# Patient Record
Sex: MALE | Race: WHITE | Employment: OTHER | URBAN - METROPOLITAN AREA
[De-identification: names, ages, dates, MRNs, and addresses within clinical notes are randomized per-mention and may not be internally consistent; named-entity substitution may affect disease eponyms.]

---

## 2017-01-13 DIAGNOSIS — F41.9 ANXIETY: ICD-10-CM

## 2017-01-17 RX ORDER — CLONIDINE HYDROCHLORIDE 0.1 MG/1
TABLET ORAL
Qty: 60 TAB | Refills: 1 | OUTPATIENT
Start: 2017-01-17

## 2017-01-17 NOTE — TELEPHONE ENCOUNTER
He said that the clonidine did not help in past so I will ask that he come in to discuss before getting more.

## 2017-01-19 DIAGNOSIS — M54.2 NECK PAIN: ICD-10-CM

## 2017-01-19 RX ORDER — CYCLOBENZAPRINE HCL 10 MG
TABLET ORAL
Qty: 30 TAB | Refills: 1 | Status: SHIPPED | OUTPATIENT
Start: 2017-01-19 | End: 2017-03-02 | Stop reason: SDUPTHER

## 2017-01-24 DIAGNOSIS — F41.9 ANXIETY: ICD-10-CM

## 2017-01-24 RX ORDER — CLONIDINE HYDROCHLORIDE 0.1 MG/1
TABLET ORAL
Qty: 60 TAB | Refills: 1 | OUTPATIENT
Start: 2017-01-24

## 2017-01-31 ENCOUNTER — OFFICE VISIT (OUTPATIENT)
Dept: FAMILY MEDICINE CLINIC | Age: 42
End: 2017-01-31

## 2017-01-31 VITALS
DIASTOLIC BLOOD PRESSURE: 76 MMHG | BODY MASS INDEX: 37.55 KG/M2 | WEIGHT: 258 LBS | HEART RATE: 96 BPM | TEMPERATURE: 98.2 F | SYSTOLIC BLOOD PRESSURE: 103 MMHG

## 2017-01-31 DIAGNOSIS — J45.40 MODERATE PERSISTENT CHRONIC ASTHMA WITHOUT COMPLICATION: Primary | ICD-10-CM

## 2017-01-31 DIAGNOSIS — Z23 ENCOUNTER FOR IMMUNIZATION: ICD-10-CM

## 2017-01-31 DIAGNOSIS — I10 ESSENTIAL HYPERTENSION: ICD-10-CM

## 2017-01-31 NOTE — PROGRESS NOTES
[facility] documentation. No contraindications for administering today's ordered vaccines. Vaccine Immunization Statement(s) given and instructions for adverse reaction. No adverse reaction noted at time of discharge, explained possible s/e. Reviewed symptoms indicating need to be seen in ER. Patient given ppv23 vaccine; denies fever. Pt had no adverse reaction at time of d/c. Vaccine administration documented into [place] Immunization Information System.

## 2017-01-31 NOTE — MR AVS SNAPSHOT
Visit Information Date & Time Provider Department Dept. Phone Encounter #  
 1/31/2017  3:15 PM Bean Pringle, [address] [phone] [id number] Follow-up Instructions Return in about 6 weeks (around 3/14/2017). Upcoming Health Maintenance Date Due Pneumococcal 19-64 Medium Risk (1 of 1 - PPSV23) 9/18/1993 DTaP/Tdap/Td series (1 - Tdap) 9/18/1995 Allergies as of 1/31/2017  Review Complete On: 1/31/2017 By: Bean Pringle MD  
 No Known Allergies Current Immunizations  Reviewed on 1/31/2017 Name Date Influenza Vaccine 9/20/2016 Influenza Vaccine (Quad) PF 10/6/2015  1:10 PM  
 Pneumococcal Polysaccharide (PPSV-23)  Incomplete Reviewed by Bean Pringle MD on 1/31/2017 at  4:17 PM  
You Were Diagnosed With   
  
 Codes Comments Moderate persistent chronic asthma without complication    -  Primary ICD-10-CM: J45.40 ICD-9-CM: 493.90 Essential hypertension     ICD-10-CM: I10 
ICD-9-CM: 401.9 Encounter for immunization     ICD-10-CM: B43 ICD-9-CM: V03.89 Vitals BP Pulse Temp Weight(growth percentile) BMI Smoking Status 103/76 (BP 1 Location: Left arm, BP Patient Position: Sitting) 96 98.2 °F (36.8 °C) (Oral) 258 lb (117 kg) 37.55 kg/m2 Former Smoker Vitals History BMI and BSA Data Body Mass Index Body Surface Area  
 37.55 kg/m 2 2.4 m 2 Preferred Pharmacy Pharmacy Name Phone [address] [phone] Your Updated Medication List  
  
   
This list is accurate as of: 1/31/17  4:21 PM.  Always use your most recent med list.  
  
  
  
  
 * albuterol 2.5 mg /3 mL (0.083 %) nebulizer solution Commonly known as:  PROVENTIL VENTOLIN  
3 mL by Nebulization route every four (4) hours as needed for Wheezing. * VENTOLIN HFA 90 mcg/actuation inhaler Generic drug:  albuterol INHALE 2 PUFFS BY MOUTH EVERY 4 TO 6 HOURS IF NEEDED FOR BREATHING.  
  
 budesonide-formoterol 160-4.5 mcg/actuation HFA inhaler Commonly known as:  SYMBICORT Take 2 Puffs by inhalation two (2) times a day. cyclobenzaprine 10 mg tablet Commonly known as:  FLEXERIL  
take 1 tablet by mouth three times a day if needed for muscle spasm  
  
 guaiFENesin  mg SR tablet Commonly known as:  [address] Take 1 Tab by mouth two (2) times a day. lisinopril-hydroCHLOROthiazide 10-12.5 mg per tablet Commonly known as:  Izola Boots Take 1 Tab by mouth daily. montelukast 10 mg tablet Commonly known as:  SINGULAIR  
take 1 tablet by mouth once daily * Notice: This list has 2 medication(s) that are the same as other medications prescribed for you. Read the directions carefully, and ask your doctor or other care provider to review them with you. We Performed the Following PNEUMOCOCCAL POLYSACCHARIDE VACCINE, 23-VALENT, ADULT OR IMMUNOSUPPRESSED PT DOSE, [42191 CPT(R)] Follow-up Instructions Return in about 6 weeks (around 3/14/2017). Introducing [facility] HE[facility]! [facility] introduces [employer] patient portal. Now you can access parts of your medical record, email your doctor's office, and request medication refills online. 1. In your internet browser, go to [email or web address] 2. Click on the First Time User? Click Here link in the Sign In box. You will see the New Member Sign Up page. 3. Enter your [employer] Access Code exactly as it appears below. You will not need to use this code after youve completed the sign-up process. If you do not sign up before the expiration date, you must request a new code. · [employer] Access Code: [email or web address] Expires: 5/1/2017  4:16 PM 
 
4. Enter the last four digits of your Social Security Number (xxxx) and Date of Birth (mm/dd/yyyy) as indicated and click Submit.  You will be taken to the next sign-up page. 5. Create a Work[employer] ID. This will be your Work[employer] login ID and cannot be changed, so think of one that is secure and easy to remember. 6. Create a WorkSn[employer] password. You can change your password at any time. 7. Enter your Password Reset Question and Answer. This can be used at a later time if you forget your password. 8. Enter your e-mail address. You will receive e-mail notification when new information is available in [address] 9. Click Sign Up. You can now view and download portions of your medical record. 10. Click the Download Summary menu link to download a portable copy of your medical information. If you have questions, please visit the Frequently Asked Questions section of the WorkSn[employer] website. Remember, Work[employer] is NOT to be used for urgent needs. For medical emergencies, dial 911. Now available from your iPhone and Android! Please provide this summary of care documentation to your next provider. Your primary care clinician is listed as Phys Other. If you have any questions after today's visit, please call [phone].

## 2017-01-31 NOTE — PROGRESS NOTES
Coordination of Care  1. Have you been to the ER, urgent care clinic since your last visit? Hospitalized since your last visit? No    2. Have you seen or consulted any other health care providers outside of the [address] [place] [address] since your last visit? Include any pap smears or colon screening. No    Medications  Medication Reconciliation Performed: no  Patient does not need refills     Learning Assessment Complete?  yes

## 2017-03-02 DIAGNOSIS — M54.2 NECK PAIN: ICD-10-CM

## 2017-03-02 RX ORDER — CYCLOBENZAPRINE HCL 10 MG
10 TABLET ORAL
Qty: 30 TAB | Refills: 2 | Status: SHIPPED | OUTPATIENT
Start: 2017-03-02 | End: 2017-04-27 | Stop reason: SDUPTHER

## 2017-03-13 ENCOUNTER — TELEPHONE (OUTPATIENT)
Dept: FAMILY MEDICINE CLINIC | Age: 42
End: 2017-03-13

## 2017-03-13 NOTE — TELEPHONE ENCOUNTER
Pt LM asking for a call, he has a question about his prescription. Called LM asking him to call the clinic.

## 2017-04-19 ENCOUNTER — TELEPHONE (OUTPATIENT)
Dept: FAMILY MEDICINE CLINIC | Age: 42
End: 2017-04-19

## 2017-04-19 NOTE — TELEPHONE ENCOUNTER
Pt left message stating that he thought that he might have an appt today. RN called pt back, left message for return call. Pt had appt on 3/15/17 that was cancelled. No upcoming appts listed.   Do Riggins RN

## 2017-04-26 ENCOUNTER — OFFICE VISIT (OUTPATIENT)
Dept: FAMILY MEDICINE CLINIC | Age: 42
End: 2017-04-26

## 2017-04-26 VITALS
SYSTOLIC BLOOD PRESSURE: 124 MMHG | BODY MASS INDEX: 38.06 KG/M2 | OXYGEN SATURATION: 92 % | HEART RATE: 92 BPM | DIASTOLIC BLOOD PRESSURE: 67 MMHG | HEIGHT: 69 IN | TEMPERATURE: 97.9 F | WEIGHT: 257 LBS

## 2017-04-26 DIAGNOSIS — S61.452A DOG BITE, HAND, LEFT, INITIAL ENCOUNTER: ICD-10-CM

## 2017-04-26 DIAGNOSIS — I10 ESSENTIAL HYPERTENSION WITH GOAL BLOOD PRESSURE LESS THAN 140/90: ICD-10-CM

## 2017-04-26 DIAGNOSIS — M70.22 OLECRANON BURSITIS OF LEFT ELBOW: ICD-10-CM

## 2017-04-26 DIAGNOSIS — J45.40 MODERATE PERSISTENT CHRONIC ASTHMA WITHOUT COMPLICATION: Primary | ICD-10-CM

## 2017-04-26 DIAGNOSIS — J30.2 SEASONAL ALLERGIC RHINITIS, UNSPECIFIED ALLERGIC RHINITIS TRIGGER: ICD-10-CM
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
## 2017-04-26 RX ORDER — ALBUTEROL SULFATE 0.83 MG/ML
2.5 SOLUTION RESPIRATORY (INHALATION)
Qty: 1 PACKAGE | Refills: 2 | Status: SHIPPED | OUTPATIENT
Start: 2017-04-26 | End: 2018-02-07 | Stop reason: SDUPTHER

## 2017-04-26 RX ORDER — ALBUTEROL SULFATE 90 UG/1
2 AEROSOL, METERED RESPIRATORY (INHALATION)
Qty: 2 INHALER | Refills: 6 | Status: SHIPPED | OUTPATIENT
Start: 2017-04-26 | End: 2017-10-19 | Stop reason: SDUPTHER

## 2017-04-26 RX ORDER — AMOXICILLIN AND CLAVULANATE POTASSIUM 500; 125 MG/1; MG/1
1 TABLET, FILM COATED ORAL EVERY 12 HOURS
Qty: 14 TAB | Refills: 0 | Status: SHIPPED | OUTPATIENT
Start: 2017-04-26 | End: 2017-05-03

## 2017-04-26 RX ORDER — LISINOPRIL AND HYDROCHLOROTHIAZIDE 10; 12.5 MG/1; MG/1
1 TABLET ORAL DAILY
Qty: 30 TAB | Refills: 5 | Status: SHIPPED | OUTPATIENT
Start: 2017-04-26 | End: 2017-10-19 | Stop reason: SDUPTHER

## 2017-04-26 RX ORDER — MOMETASONE FUROATE 50 UG/1
2 SPRAY, METERED NASAL DAILY
Qty: 2 CONTAINER | Refills: 5 | Status: SHIPPED | OUTPATIENT
Start: 2017-04-26 | End: 2018-08-30 | Stop reason: SDUPTHER

## 2017-04-26 NOTE — MR AVS SNAPSHOT
Visit Information Date & Time Provider Department Dept. Phone Encounter #  
 4/26/2017  2:55 PM Keisha Encinas, [address] [place] [address] Avenue [phone] [id number] Upcoming Health Maintenance Date Due DTaP/Tdap/Td series (1 - Tdap) 9/18/1995 Allergies as of 4/26/2017  Review Complete On: 4/26/2017 By: Keisha Encinas MD  
 No Known Allergies Current Immunizations  Reviewed on 1/31/2017 Name Date Influenza Vaccine 9/20/2016 Influenza Vaccine (Quad) PF 10/6/2015  1:10 PM  
 Pneumococcal Polysaccharide (PPSV-23) 1/31/2017 Not reviewed this visit You Were Diagnosed With   
  
 Codes Comments Moderate persistent chronic asthma without complication    -  Primary ICD-10-CM: J45.40 ICD-9-CM: 493.90 Simple chronic bronchitis (HCC)     ICD-10-CM: J41.0 ICD-9-CM: 491.0 Seasonal allergic rhinitis, unspecified allergic rhinitis trigger     ICD-10-CM: J30.2 ICD-9-CM: 477.9 Moderate persistent chronic asthma with acute exacerbation     ICD-10-CM: J45.41 
ICD-9-CM: 493.92 Essential hypertension with goal blood pressure less than 140/90     ICD-10-CM: I10 
ICD-9-CM: 401.9 Olecranon bursitis of left elbow     ICD-10-CM: M70.22 ICD-9-CM: 726.33 Vitals BP Pulse Temp Height(growth percentile) Weight(growth percentile) SpO2  
 124/67 (BP 1 Location: Right arm, BP Patient Position: Sitting) 92 97.9 °F (36.6 °C) (Oral) 5' 9.49\" (1.765 m) 257 lb (116.6 kg) 92% BMI Smoking Status 37.42 kg/m2 Former Smoker Vitals History BMI and BSA Data Body Mass Index Body Surface Area  
 37.42 kg/m 2 2.39 m 2 Preferred Pharmacy Pharmacy Name Phone [address] [place] [address], [address] [facility] [phone] Your Updated Medication List  
  
   
This list is accurate as of: 4/26/17  4:00 PM.  Always use your most recent med list.  
  
  
  
  
 * albuterol 90 mcg/actuation inhaler Commonly known as:  VENTOLIN HFA Take 2 Puffs by inhalation every four (4) hours as needed for Wheezing. * albuterol 2.5 mg /3 mL (0.083 %) nebulizer solution Commonly known as:  PROVENTIL VENTOLIN  
3 mL by Nebulization route every four (4) hours as needed for Wheezing. amoxicillin-clavulanate 500-125 mg per tablet Commonly known as:  AUGMENTIN Take 1 Tab by mouth every twelve (12) hours for 7 days. budesonide-formoterol 160-4.5 mcg/actuation HFA inhaler Commonly known as:  SYMBICORT Take 2 Puffs by inhalation two (2) times a day. cyclobenzaprine 10 mg tablet Commonly known as:  FLEXERIL Take 1 Tab by mouth three (3) times daily as needed for Muscle Spasm(s). guaiFENesin  mg SR tablet Commonly known as:  [address] Take 1 Tab by mouth two (2) times a day. lisinopril-hydroCHLOROthiazide 10-12.5 mg per tablet Commonly known as:  Pritesh Junito Take 1 Tab by mouth daily. mometasone 50 mcg/actuation nasal spray Commonly known as:  NASONEX  
2 Sprays by Both Nostrils route daily. montelukast 10 mg tablet Commonly known as:  SINGULAIR  
take 1 tablet by mouth once daily * Notice: This list has 2 medication(s) that are the same as other medications prescribed for you. Read the directions carefully, and ask your doctor or other care provider to review them with you. Prescriptions Sent to Pharmacy Refills  
 mometasone (NASONEX) 50 mcg/actuation nasal spray 5 Si Sprays by Both Nostrils route daily. Class: Normal  
 Pharmacy: [address] [place] [address] Ph #: [phone] Route: Both Nostrils  
 albuterol (VENTOLIN HFA) 90 mcg/actuation inhaler 6 Sig: Take 2 Puffs by inhalation every four (4) hours as needed for Wheezing. Class: Normal  
 Pharmacy: [address] [place] [address] Ph #: [phone] Route: Inhalation albuterol (PROVENTIL VENTOLIN) 2.5 mg /3 mL (0.083 %) nebulizer solution 2 Sig: 3 mL by Nebulization route every four (4) hours as needed for Wheezing. Class: Normal  
 Pharmacy: [address] , [address] [facility] Ph #: [phone] Route: Nebulization  
 amoxicillin-clavulanate (AUGMENTIN) 500-125 mg per tablet 0 Sig: Take 1 Tab by mouth every twelve (12) hours for 7 days. Class: Normal  
 Pharmacy: [address] , [address] [place] [address] Ph #: [phone] Route: Oral  
 lisinopril-hydroCHLOROthiazide (PRINZIDE, ZESTORETIC) 10-12.5 mg per tablet 5 Sig: Take 1 Tab by mouth daily. Class: Normal  
 Pharmacy: [address] , [address] [place] [address] Ph #: [phone] Route: Oral  
  
Patient Instructions Start generic Claritin, Allegra, or Zyrtec. Introducing [facility]! Denise Alonso introduces [employer] patient portal. Now you can access parts of your medical record, email your doctor's office, and request medication refills online. 1. In your internet browser, go to [email or web address]. [email or web address] 2. Click on the First Time User? Click Here link in the Sign In box. You will see the New Member Sign Up page. 3. Enter your [employer] Access Code exactly as it appears below. You will not need to use this code after youve completed the sign-up process. If you do not sign up before the expiration date, you must request a new code. · [employer] Access Code: [email or web address] Expires: 5/1/2017  5:16 PM 
 
4. Enter the last four digits of your Social Security Number (xxxx) and Date of Birth (mm/dd/yyyy) as indicated and click Submit. You will be taken to the next sign-up page. 5. Create a [employer] ID. This will be your [employer] login ID and cannot be changed, so think of one that is secure and easy to remember. 6. Create a [employer] System[employer]arMeetapp password. You can change your password at any time. 7. Enter your Password Reset Question and Answer. This can be used at a later time if you forget your password. 8. Enter your e-mail address. You will receive e-mail notification when new information is available in [address] 9. Click Sign Up. You can now view and download portions of your medical record. 10. Click the Download Summary menu link to download a portable copy of your medical information. If you have questions, please visit the Frequently Asked Questions section of the [employer] website. Remember, [employer] is NOT to be used for urgent needs. For medical emergencies, dial 911. Now available from your iPhone and Android! Please provide this summary of care documentation to your next provider. Your primary care clinician is listed as Phys Other. If you have any questions after today's visit, please call [phone].

## 2017-04-26 NOTE — PROGRESS NOTES
Coordination of Care  1. Have you been to the ER, urgent care clinic since your last visit? Hospitalized since your last visit? No    2. Have you seen or consulted any other health care providers outside of the [facility] since your last visit? Include any pap smears or colon screening. No    Medications  Medication Reconciliation Performed: no  Patient does need refills     Learning Assessment Complete?  yes
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
## 2017-04-27 DIAGNOSIS — M54.2 NECK PAIN: ICD-10-CM

## 2017-04-27 RX ORDER — CYCLOBENZAPRINE HCL 10 MG
10 TABLET ORAL
Qty: 30 TAB | Refills: 1 | Status: SHIPPED | OUTPATIENT
Start: 2017-04-27 | End: 2017-06-26 | Stop reason: SDUPTHER

## 2017-04-28 ENCOUNTER — TELEPHONE (OUTPATIENT)
Dept: FAMILY MEDICINE CLINIC | Age: 42
End: 2017-04-28

## 2017-04-28 NOTE — TELEPHONE ENCOUNTER
.Pt left message asking about a refill for Flexeril that he discussed with Dr. Zaid Walden during his visit on 4/26/17. Chart review indicates Flexeril was refilled on 4/27/17, 30 tabs plus one refill. RN called pt, left message regarding this refill.  Los Christensen RN

## 2017-05-22 ENCOUNTER — OFFICE VISIT (OUTPATIENT)
Dept: FAMILY MEDICINE CLINIC | Age: 42
End: 2017-05-22

## 2017-05-22 VITALS
WEIGHT: 244 LBS | OXYGEN SATURATION: 94 % | TEMPERATURE: 97.9 F | DIASTOLIC BLOOD PRESSURE: 80 MMHG | HEART RATE: 123 BPM | BODY MASS INDEX: 35.53 KG/M2 | SYSTOLIC BLOOD PRESSURE: 130 MMHG

## 2017-05-22 DIAGNOSIS — J45.40 ASTHMA, CHRONIC, MODERATE PERSISTENT, UNCOMPLICATED: ICD-10-CM

## 2017-05-22 DIAGNOSIS — J45.901 ASTHMA EXACERBATION: ICD-10-CM

## 2017-05-22 RX ORDER — ALBUTEROL SULFATE 0.83 MG/ML
2.5 SOLUTION RESPIRATORY (INHALATION) ONCE
Qty: 1 EACH | Refills: 0
Start: 2017-05-22 | End: 2017-05-22

## 2017-05-22 RX ORDER — PREDNISONE 20 MG/1
TABLET ORAL
Qty: 20 TAB | Refills: 0 | Status: SHIPPED | OUTPATIENT
Start: 2017-05-22 | End: 2017-07-06 | Stop reason: ALTCHOICE

## 2017-05-22 RX ORDER — MONTELUKAST SODIUM 10 MG/1
TABLET ORAL
Qty: 30 TAB | Refills: 0 | Status: SHIPPED | OUTPATIENT
Start: 2017-05-22 | End: 2017-10-06 | Stop reason: SDUPTHER

## 2017-05-22 RX ORDER — FLUTICASONE PROPIONATE AND SALMETEROL 250; 50 UG/1; UG/1
1 POWDER RESPIRATORY (INHALATION) EVERY 12 HOURS
Qty: 1 INHALER | Refills: 5 | Status: SHIPPED | OUTPATIENT
Start: 2017-05-22 | End: 2017-06-01

## 2017-05-22 NOTE — PROGRESS NOTES
HISTORY OF PRESENT ILLNESS  Oli Nails is a 42 y.o. male. HPI Comments: Wheezing with cough prod clear sputum for 2 weeks, nasal congestion and clear nasal d/c, no fever or h/a. .  Taking all meds correctly except hasn't had the $ for singulair. Sounds like he is using neb less than once daily but some days several times. Also sounds like he has no money right now. Wife is here in tears that they are losing their house after she recently lost her job. He is a nonsmoker, has always had seasonal allergies, h/o adenoid removal as a child. Was told at [facility] that they no longer have symbicort. Shortness of Breath         Review of Systems   Respiratory: Positive for shortness of breath. Physical Exam   Constitutional: He appears well-developed and well-nourished. He appears distressed. Initially sob with rapid breathing and pulse 120. After slow deep breathing for 5-10 minutes, pulse and rr decreased. Wt down 13 lb from 2 months ago. Pulmonary/Chest:   Diffuse exp wheezing with fair air movement initially, improved after each of 3 tx. After the third, he still had a few wheezes right lower lungfields, no crackles. ASSESSMENT and PLAN  Asthma exaccerbation. pred taper over 2 weeks, use albuterol neb or inhaler tid-qid prn cough or wheezing. Will rx advair instead of symbicort. Use of last resort fund for 1 month of singulair and the pred. F/u 2 weeks, prn earlier. Discussed that if sx get like today again, needs to go to ED.

## 2017-05-22 NOTE — PROGRESS NOTES
Administered three albuterol sulfate inh sol nebulizer treatments to patient back to back per verbal order from provider Dr. Kingsley Plummer, patient tolerated well, Dr Kingsley Plummer notified after completion of each treatment. Each treatment consists of albuterol sulfate inh sol 0.083% 2.5mg/3ml , lot #[id number], exp Nov 2018, manufactured by [facility] [address] [place] [address] initiated per Dr. Kingsley Plummer for this patient to receive one month of the Singulair and the Rx for the prednisone. Last Resort paperwork filled out per instructions, nursing supervisor Gagandeep Burch RN notified, faxed to pt pharmacy [facility] on [address], copy of what was sent to pharmacy given to pt, instructed pt to call the clinic back with any problems w/getting the meds. Paperwork also sent to [facility].

## 2017-05-25 ENCOUNTER — OFFICE VISIT (OUTPATIENT)
Dept: FAMILY MEDICINE CLINIC | Age: 42
End: 2017-05-25

## 2017-05-25 VITALS
SYSTOLIC BLOOD PRESSURE: 122 MMHG | BODY MASS INDEX: 36.11 KG/M2 | DIASTOLIC BLOOD PRESSURE: 77 MMHG | WEIGHT: 248 LBS | TEMPERATURE: 98 F | HEART RATE: 100 BPM | OXYGEN SATURATION: 95 %

## 2017-05-25 DIAGNOSIS — J45.901 EXACERBATION OF ASTHMA: ICD-10-CM

## 2017-05-25 DIAGNOSIS — M70.22 OLECRANON BURSITIS OF LEFT ELBOW: Primary | ICD-10-CM

## 2017-05-25 NOTE — PROGRESS NOTES
HISTORY OF PRESENT ILLNESS  Mian Nunn is a 42 y.o. male. HPI  He returns for follow up of his left elbow effusion. His left elbow effusion is almost gone away completely and it is not bothering him now. Breathing is improving. Still on prednisone. He is pleased that his breathing is nearing baseline. He has an appointment to come back on June 6 for follow-up of his breathing problem. ROS    Visit Vitals    /77 (BP 1 Location: Left arm, BP Patient Position: Sitting)    Pulse 100    Temp 98 °F (36.7 °C) (Oral)    Wt 248 lb (112.5 kg)    SpO2 95%    BMI 36.11 kg/m2     Physical Exam   Constitutional: He appears well-developed and well-nourished. No distress. Cardiovascular: Regular rhythm and normal heart sounds. Pulmonary/Chest: Effort normal. He has wheezes (diffusely). He has no rhonchi. He has no rales. Musculoskeletal:   There is a very small effusion remaining in the left elbow olecranon bursa. The area is not red, hot, or tender. There is a small area of induration felt that is mobile. Vitals reviewed. ASSESSMENT and PLAN    ICD-10-CM ICD-9-CM    1. Olecranon bursitis of left elbow M70.22 726.33    2. Exacerbation of asthma J45.901 493.92        Recommend he try to avoid any trauma to the left elbow. Continue to finish taking the prednisone as ordered. Return for the June 6 follow-up appointment.

## 2017-05-25 NOTE — PROGRESS NOTES
Coordination of Care  1. Have you been to the ER, urgent care clinic since your last visit? Hospitalized since your last visit? No    2. Have you seen or consulted any other health care providers outside of the [address] [place] [address] since your last visit? Include any pap smears or colon screening. No    Medications  Medication Reconciliation Performed: yes  Patient does not need refills     Learning Assessment Complete?  yes

## 2017-06-01 ENCOUNTER — HOSPITAL ENCOUNTER (EMERGENCY)
Age: 42
Discharge: HOME | End: 2017-06-01
Attending: EMERGENCY MEDICINE
Payer: SELF-PAY

## 2017-06-01 ENCOUNTER — TELEPHONE (OUTPATIENT)
Dept: FAMILY MEDICINE CLINIC | Age: 42
End: 2017-06-01

## 2017-06-01 VITALS
OXYGEN SATURATION: 99 % | RESPIRATION RATE: 20 BRPM | SYSTOLIC BLOOD PRESSURE: 143 MMHG | BODY MASS INDEX: 41.09 KG/M2 | HEART RATE: 108 BPM | DIASTOLIC BLOOD PRESSURE: 89 MMHG | HEIGHT: 70 IN | TEMPERATURE: 98.1 F | WEIGHT: 287 LBS

## 2017-06-01 DIAGNOSIS — L03.311 CELLULITIS OF ABDOMINAL WALL: ICD-10-CM

## 2017-06-01 DIAGNOSIS — L02.91 ABSCESS: Primary | ICD-10-CM

## 2017-06-01 PROCEDURE — 77030019895 HC PCKNG STRP IODO -A

## 2017-06-01 PROCEDURE — 99282 EMERGENCY DEPT VISIT SF MDM: CPT

## 2017-06-01 PROCEDURE — 75810000289 HC I&D ABSCESS SIMP/COMP/MULT

## 2017-06-01 PROCEDURE — 74011000250 HC RX REV CODE- 250: Performed by: NURSE PRACTITIONER

## 2017-06-01 RX ORDER — LIDOCAINE HYDROCHLORIDE 10 MG/ML
10 INJECTION INFILTRATION; PERINEURAL ONCE
Status: COMPLETED | OUTPATIENT
Start: 2017-06-01 | End: 2017-06-01

## 2017-06-01 RX ORDER — BUDESONIDE AND FORMOTEROL FUMARATE DIHYDRATE 160; 4.5 UG/1; UG/1
2 AEROSOL RESPIRATORY (INHALATION) 2 TIMES DAILY
COMMUNITY
End: 2017-07-06 | Stop reason: ALTCHOICE

## 2017-06-01 RX ORDER — DOXYCYCLINE HYCLATE 100 MG
100 TABLET ORAL 2 TIMES DAILY
Qty: 20 TAB | Refills: 0 | Status: SHIPPED | OUTPATIENT
Start: 2017-06-01 | End: 2017-06-11

## 2017-06-01 RX ORDER — IBUPROFEN 800 MG/1
800 TABLET ORAL
Qty: 15 TAB | Refills: 0 | Status: SHIPPED | OUTPATIENT
Start: 2017-06-01 | End: 2018-05-17

## 2017-06-01 RX ADMIN — LIDOCAINE HYDROCHLORIDE 10 ML: 10 INJECTION, SOLUTION INFILTRATION; PERINEURAL at 14:45

## 2017-06-01 NOTE — ED PROVIDER NOTES
HPI Comments: 42 y.o. male with past medical history significant for asthma, HTN, and opiate abuse who presents from home with chief complaint of abscess. Pt states that he noticed a pimple in his RLQ 3 days ago. He popped the initial pimple and since then the area of surrounding erythema has grown. He has been doing hot compresses and has been able to get some drainage. He has been taking prednisone the last 2 weeks. He is unsure when his last tetanus was. He denies any diabetes, ulcerative colitis, fever, or chills. There are no other acute medical concerns at this time. PCP: Anita Yadav MD  Social History    Marital status:              Spouse name:                       Years of education:                 Number of children:               Occupational History    None on file    Social History Main Topics    Smoking status: Former Smoker                                                                Packs/day: 0.00      Years: 0.00        Smokeless status: Never Used                        Alcohol use: Yes           0.0 oz/week       0 Standard drinks or equivalent per week       Comment: occ    Drug use: Yes                Special: Opiates, Marijuana    Sexual activity: Not on file          Other Topics            Concern    None on file    Social History Narrative    2015: worked previously as a  , currently unemployed while in a methadone treatment program, he is  and lives with his wife. The history is provided by the patient. No  was used. Past Medical History:   Diagnosis Date    Asthma     HTN (hypertension)     Opiate abuse, episodic        No past surgical history on file.       Family History:   Problem Relation Age of Onset    COPD Mother     Colon Cancer Father       at 76       Social History     Social History    Marital status:      Spouse name: N/A    Number of children: N/A    Years of education: N/A Occupational History    Not on file. Social History Main Topics    Smoking status: Former Smoker    Smokeless tobacco: Never Used    Alcohol use 0.0 oz/week     0 Standard drinks or equivalent per week      Comment: occ    Drug use: Yes     Special: Opiates, Marijuana    Sexual activity: Not on file     Other Topics Concern    Not on file     Social History Narrative    7/2015: worked previously as a  , currently unemployed while in a methadone treatment program, he is  and lives with his wife. ALLERGIES: Review of patient's allergies indicates no known allergies. Review of Systems   Constitutional: Negative for chills and fever. HENT: Negative for congestion and facial swelling. Eyes: Negative for discharge. Respiratory: Negative for cough and shortness of breath. Cardiovascular: Negative for chest pain and leg swelling. Gastrointestinal: Negative for abdominal pain, constipation, diarrhea, nausea and vomiting. Genitourinary: Negative for dysuria and urgency. Musculoskeletal: Negative for myalgias. Skin: Positive for color change and wound. Neurological: Negative for seizures and facial asymmetry. Psychiatric/Behavioral: Negative for confusion. Vitals:    06/01/17 1413   BP: 143/89   Pulse: (!) 108   Resp: 20   Temp: 98.1 °F (36.7 °C)   SpO2: 99%   Weight: 130.2 kg (287 lb)   Height: 5' 10\" (1.778 m)            Physical Exam   Constitutional: He is oriented to person, place, and time. He appears well-developed and well-nourished. No distress. HENT:   Head: Normocephalic and atraumatic. Eyes: Conjunctivae and EOM are normal. Pupils are equal, round, and reactive to light. Neck: Normal range of motion. Neck supple. Cardiovascular: Normal rate, regular rhythm, normal heart sounds and intact distal pulses. Pulmonary/Chest: Effort normal and breath sounds normal. No accessory muscle usage. No tachypnea. No respiratory distress.  He has no decreased breath sounds. He has no wheezes. B breath sounds CTA. No expiratory wheezing, crackles or rhonchi. No evidence of hypoxia. Abdominal: Soft. Bowel sounds are normal. He exhibits no distension. There is no tenderness. There is no rigidity and no guarding. Abdomen soft with tenderness only to erythematous region. BS active throughout. No rigidity, masses or guarding. No flank or CVA tenderness. Musculoskeletal: Normal range of motion. Neurological: He is alert and oriented to person, place, and time. He has normal strength. He displays no atrophy. No cranial nerve deficit or sensory deficit. He exhibits normal muscle tone. Coordination and gait normal. GCS eye subscore is 4. GCS verbal subscore is 5. GCS motor subscore is 6. Skin: Skin is warm and dry. See picture   Psychiatric: He has a normal mood and affect. His behavior is normal. Judgment and thought content normal.   Nursing note and vitals reviewed. MDM  Number of Diagnoses or Management Options  Abscess:   Cellulitis of abdominal wall:   Diagnosis management comments: 41 yo M with redness to R side of abdomen with cellulitic appearance. The pt states sx started 3 days ago as a pimple which he states he squeezed and was able to express some drainage from the middle. The pt states area began draining less over the last 2 days with increase in erythema and tenderness. Dime sized hard center. Denies fever, chills, nausea, vomiting, diarrhea or myalgias. Discussed hx, presentation with Dr. Daniella Ramirez who personally evaluated the pt and agrees with plan for I and D and discharge on antibiotics. Pt tolerated I and D well. Measured size of cellulitic region. Recommend pt FU with PCP on Monday, return to the ED for worsening sx which were reviewed with the pt prior to discharge. Pt agreeable to plan of care and plan for discharge.          Amount and/or Complexity of Data Reviewed  Discuss the patient with other providers: yes (Dr. Daniella Ramirez)    Patient Progress  Patient progress: stable    ED Course       I&D Abcess Simple  Date/Time: 6/1/2017 2:54 PM  Performed by: Yumiko Blackwood  Authorized by: Jocelin GARCIA     Consent:     Consent obtained:  Verbal    Consent given by:  Patient    Risks discussed:  Bleeding, incomplete drainage, pain and infection    Alternatives discussed:  No treatment  Location:     Type:  Abscess    Size:  14 cm x 20 cm     Location:  Trunk    Trunk location:  Abdomen  Pre-procedure details:     Skin preparation:  Antiseptic wash, Betadine and Chloraprep  Anesthesia (see MAR for exact dosages): Anesthesia method:  Local infiltration    Local anesthetic:  Lidocaine 1% w/o epi  Procedure type:     Complexity:  Simple  Procedure details:     Needle aspiration: no      Incision types:  Stab incision    Incision depth:  Subcutaneous    Scalpel blade:  11    Wound management:  Probed and deloculated    Drainage:  Serosanguinous    Drainage amount:  Scant    Wound treatment:  Wound left open    Packing materials:  1/2 in iodoform gauze  Post-procedure details:     Patient tolerance of procedure:   Tolerated well, no immediate complications

## 2017-06-01 NOTE — DISCHARGE INSTRUCTIONS
Skin Abscess: Care Instructions  Your Care Instructions    A skin abscess is a bacterial infection that forms a pocket of pus. A boil is a kind of skin abscess. The doctor may have cut an opening in the abscess so that the pus can drain out. You may have gauze in the cut so that the abscess will stay open and keep draining. You may need antibiotics. You will need to follow up with your doctor to make sure the infection has gone away. The doctor has checked you carefully, but problems can develop later. If you notice any problems or new symptoms, get medical treatment right away. Follow-up care is a key part of your treatment and safety. Be sure to make and go to all appointments, and call your doctor if you are having problems. It's also a good idea to know your test results and keep a list of the medicines you take. How can you care for yourself at home? · Apply warm and dry compresses, a heating pad set on low, or a hot water bottle 3 or 4 times a day for pain. Keep a cloth between the heat source and your skin. · If your doctor prescribed antibiotics, take them as directed. Do not stop taking them just because you feel better. You need to take the full course of antibiotics. · Take pain medicines exactly as directed. ¨ If the doctor gave you a prescription medicine for pain, take it as prescribed. ¨ If you are not taking a prescription pain medicine, ask your doctor if you can take an over-the-counter medicine. · Keep your bandage clean and dry. Change the bandage whenever it gets wet or dirty, or at least one time a day. · If the abscess was packed with gauze:  ¨ Keep follow-up appointments to have the gauze changed or removed. If the doctor instructed you to remove the gauze, gently pull out all of the gauze when your doctor tells you to. ¨ After the gauze is removed, soak the area in warm water for 15 to 20 minutes 2 times a day, until the wound closes. When should you call for help?   Call your doctor now or seek immediate medical care if:  · You have signs of worsening infection, such as:  ¨ Increased pain, swelling, warmth, or redness. ¨ Red streaks leading from the infected skin. ¨ Pus draining from the wound. ¨ A fever. Watch closely for changes in your health, and be sure to contact your doctor if:  · You do not get better as expected. Where can you learn more? Go to http://francie-ruchi.info/. Enter T562 in the search box to learn more about \"Skin Abscess: Care Instructions. \"  Current as of: October 13, 2016  Content Version: 11.2  © 3859-5341 L2. Care instructions adapted under license by [employer] (which disclaims liability or warranty for this information). If you have questions about a medical condition or this instruction, always ask your healthcare professional. [place] [address] any warranty or liability for your use of this information. Cellulitis: Care Instructions  Your Care Instructions    Cellulitis is a skin infection. It often occurs after a break in the skin from a scrape, cut, bite, or puncture, or after a rash. The doctor has checked you carefully, but problems can develop later. If you notice any problems or new symptoms, get medical treatment right away. Follow-up care is a key part of your treatment and safety. Be sure to make and go to all appointments, and call your doctor if you are having problems. It's also a good idea to know your test results and keep a list of the medicines you take. How can you care for yourself at home? · Take your antibiotics as directed. Do not stop taking them just because you feel better. You need to take the full course of antibiotics. · Prop up the infected area on pillows to reduce pain and swelling. Try to keep the area above the level of your heart as often as you can.   · If your doctor told you how to care for your wound, follow your doctor's instructions. If you did not get instructions, follow this general advice:  ¨ Wash the wound with clean water 2 times a day. Don't use hydrogen peroxide or alcohol, which can slow healing. ¨ You may cover the wound with a thin layer of petroleum jelly, such as Vaseline, and a nonstick bandage. ¨ Apply more petroleum jelly and replace the bandage as needed. · Be safe with medicines. Take pain medicines exactly as directed. ¨ If the doctor gave you a prescription medicine for pain, take it as prescribed. ¨ If you are not taking a prescription pain medicine, ask your doctor if you can take an over-the-counter medicine. To prevent cellulitis in the future  · Try to prevent cuts, scrapes, or other injuries to your skin. Cellulitis most often occurs where there is a break in the skin. · If you get a scrape, cut, mild burn, or bite, wash the wound with clean water as soon as you can to help avoid infection. Don't use hydrogen peroxide or alcohol, which can slow healing. · If you have swelling in your legs (edema), support stockings and good skin care may help prevent leg sores and cellulitis. · Take care of your feet, especially if you have diabetes or other conditions that increase the risk of infection. Wear shoes and socks. Do not go barefoot. If you have athlete's foot or other skin problems on your feet, talk to your doctor about how to treat them. When should you call for help? Call your doctor now or seek immediate medical care if:  · You have signs that your infection is getting worse, such as:  ¨ Increased pain, swelling, warmth, or redness. ¨ Red streaks leading from the area. ¨ Pus draining from the area. ¨ A fever. · You get a rash. Watch closely for changes in your health, and be sure to contact your doctor if:  · You are not getting better after 1 day (24 hours). · You do not get better as expected. Where can you learn more? Go to http://ethan.info/.   Shade Pelayo in the search box to learn more about \"Cellulitis: Care Instructions. \"  Current as of: October 13, 2016  Content Version: 11.2  © 7150-0738 VHT. Care instructions adapted under license by [employer] (which disclaims liability or warranty for this information). If you have questions about a medical condition or this instruction, always ask your healthcare professional. [address] any warranty or liability for your use of this information. We hope that we have addressed all of your medical concerns. The examination and treatment you received in the Emergency Department were for an emergent problem and were not intended as complete care. It is important that you follow up with your healthcare provider(s) for ongoing care. If your symptoms worsen or do not improve as expected, and you are unable to reach your usual health care provider(s), you should return to the Emergency Department. Today's healthcare is undergoing tremendous change, and patient satisfaction surveys are one of the many tools to assess the quality of medical care. You may receive a survey from the [employer] regarding your experience in the Emergency Department. I hope that your experience has been completely positive, particularly the medical care that I provided. As such, please participate in the survey; anything less than excellent does not meet my expectations or intentions. [facility] and [facility] participate in nationally recognized quality of care measures. If your blood pressure is greater than 120/80, as reported below, we urge that you seek medical care to address the potential of high blood pressure, commonly known as hypertension. Hypertension can be hereditary or can be caused by certain medical conditions, pain, stress, or \"white coat syndrome. \"       Please make an appointment with your health care provider(s) for follow up of your Emergency Department visit. VITALS:   Patient Vitals for the past 8 hrs:   Temp Pulse Resp BP SpO2   06/01/17 1413 98.1 °F (36.7 °C) (!) 108 20 143/89 99 %          Thank you for allowing us to provide you with medical care today. We realize that you have many choices for your emergency care needs. Please choose us in the future for any continued health care needs. [facility]Sum[facility], [address]: [phone]            No results found for this or any previous visit (from the past 24 hour(s)). No results found.

## 2017-06-01 NOTE — ED TRIAGE NOTES
Pt arrives from home c/o abscess to RIGHT lower abd. Pt reports that it has drained with heat compresses. Area red and hot to touch in triage.

## 2017-06-01 NOTE — ED NOTES
Non-stick dressing applied to wound to right lower abdomen. Patient verbalizes understanding of wound care. Denies any questions or concerns at this time. Patient verbalizes understanding of discharge instructions. Ambulatory and in no acute distress at discharge.

## 2017-06-01 NOTE — TELEPHONE ENCOUNTER
Pt's wife Pranay Matthews called, pt has a softball size 'bump' which is as hard as a rock, on his abdomen. He has been using warm compresses on it and there is a hole in the center of it which is draining a little bit. First appeared 2 weeks ago and has been getting larger, per wife, pt was hospitalized last year for MRSA. She also reports that he has some lesions under his right arm. She says during an office visit at [facility] he was given antibiotics for a lesion on his hand, they are asking for antibiotics to be called in for him. Told Pranay Matthews I would speak to Dr Leanna Celeste when he comes in this afternoon but he may need to be seen as the lesion is large.
Reviewed this message with Chico Munoz NP. Called Mrs Koffi Kam back asked that pt go to the ER in view of the size and their description of the lesion as being rock hard. Pt has a Hx of being hospitalized for MRSA. We are unable to give the patient an appointment. Wife said she will try her hardest to persuade pt to go to the ER.
S1-S2

## 2017-06-06 ENCOUNTER — PATIENT OUTREACH (OUTPATIENT)
Dept: FAMILY MEDICINE CLINIC | Age: 42
End: 2017-06-06

## 2017-06-06 ENCOUNTER — OFFICE VISIT (OUTPATIENT)
Dept: FAMILY MEDICINE CLINIC | Age: 42
End: 2017-06-06

## 2017-06-06 VITALS
SYSTOLIC BLOOD PRESSURE: 142 MMHG | DIASTOLIC BLOOD PRESSURE: 98 MMHG | WEIGHT: 261 LBS | BODY MASS INDEX: 37.45 KG/M2 | HEART RATE: 76 BPM | TEMPERATURE: 97.9 F | OXYGEN SATURATION: 97 %

## 2017-06-06 DIAGNOSIS — Z51.89 WOUND CHECK, ABSCESS: Primary | ICD-10-CM

## 2017-06-06 PROBLEM — L02.818 CELLULITIS AND ABSCESS OF OTHER SPECIFIED SITE: Status: ACTIVE | Noted: 2017-06-06

## 2017-06-06 PROBLEM — L03.818 CELLULITIS AND ABSCESS OF OTHER SPECIFIED SITE: Status: ACTIVE | Noted: 2017-06-06

## 2017-06-06 NOTE — PROGRESS NOTES
[address] Note. Spoke to spouse and left message for Shani Harris to call NN for care coordination. Leatha Hernandez is getting ready for an appointment with SHELLY Hernandez. Language spoken:  English  Cellulitis     Advance Care Planning:   Patient was offered the opportunity to discuss advance care planning:  no     Does patient have an Advance Directive:  no   If no, did you provide information on Caring Connections?  no     PCP/Specialist follow up: Patient scheduled to follow up with Jaquelin Hernandez on 17. Reviewed red flags with patient, and patient verbalizes understanding. Patient given an opportunity to ask questions. No other clinical/social/functional needs noted. The patient agrees to contact the PCP office for questions related to their healthcare. The patient expressed thanks, offered no additional questions and ended the call. Medication:   Performed medication reconciliation with patient, and patient verbalizes understanding of administration of home medications. There were no barriers to obtaining medications identified at this time. EDUCATION:  Wound care, infection control to wound, Sx/Sx of infection,reporting Sx/Sx of infection to NN/PCP immediately, eating healthy to promote healing, contact NN with barriers as soon as possible. Support system:  patient    Discharge Instructions :  Reviewed discharge instructions with patient. Patient verbalizes understanding of discharge instructions and follow-up care. Red Flags:  Wound, cellulitis. Labs Reviewed:  No results for input(s): WBC, HGB, HCT, PLT, HGBEXT, HCTEXT, PLTEXT in the last 72 hours. Medical History:     Past Medical History:   Diagnosis Date    Asthma     HTN (hypertension)     Opiate abuse, episodic          Language spoken:  English    Nurse Navigator(NN) contacted the patient by telephone to perform post ED discharge assessment. Verified  and address with patient as identifiers.   Provided introduction to self, and explanation of the Nurses Navigator role. Diet:   Patient reports: Regular Diet    Activity:    Patient reports: mostly moving around the house and somewalking outside the house         Goals Addressed      Abscess/cellulitis to abdomen. Diamante Murphy will FU with provider. Keep affected area clean and dry.  IM

## 2017-06-06 NOTE — MR AVS SNAPSHOT
Visit Information Date & Time Provider Department Dept. Phone Encounter #  
 6/6/2017  3:15 PM Charles Scott 104, [address] [facility] [phone] [id number] Follow-up Instructions Return in about 1 month (around 7/6/2017) for Dr Kyle Hartley. Upcoming Health Maintenance Date Due DTaP/Tdap/Td series (1 - Tdap) 9/18/1995 INFLUENZA AGE 9 TO ADULT 8/1/2017 Allergies as of 6/6/2017  Review Complete On: 6/6/2017 By: Stephie Singh No Known Allergies Current Immunizations  Reviewed on 1/31/2017 Name Date Influenza Vaccine 9/20/2016 Influenza Vaccine (Quad) PF 10/6/2015  1:10 PM  
 Pneumococcal Polysaccharide (PPSV-23) 1/31/2017 Not reviewed this visit You Were Diagnosed With   
  
 Codes Comments Wound check, abscess    -  Primary ICD-10-CM: Z51.89 ICD-9-CM: V58.89 Vitals BP Pulse Temp Weight(growth percentile) SpO2 BMI  
 (!) 142/98 (BP 1 Location: Right arm, BP Patient Position: Sitting) 76 97.9 °F (36.6 °C) (Oral) 261 lb (118.4 kg) 97% 37.45 kg/m2 Smoking Status Former Smoker Vitals History BMI and BSA Data Body Mass Index Body Surface Area  
 37.45 kg/m 2 2.42 m 2 Preferred Pharmacy Pharmacy Name Phone [address] [place] [address], [address] [facility] [phone] Your Updated Medication List  
  
   
This list is accurate as of: 6/6/17  3:42 PM.  Always use your most recent med list.  
  
  
  
  
 * albuterol 90 mcg/actuation inhaler Commonly known as:  VENTOLIN HFA Take 2 Puffs by inhalation every four (4) hours as needed for Wheezing. * albuterol 2.5 mg /3 mL (0.083 %) nebulizer solution Commonly known as:  PROVENTIL VENTOLIN  
3 mL by Nebulization route every four (4) hours as needed for Wheezing. cyclobenzaprine 10 mg tablet Commonly known as:  FLEXERIL Take 1 Tab by mouth three (3) times daily as needed for Muscle Spasm(s). doxycycline 100 mg tablet Commonly known as:  VIBRA-TABS Take 1 Tab by mouth two (2) times a day for 10 days. * ADVIL PO Take  by mouth as needed. * ibuprofen 800 mg tablet Commonly known as:  MOTRIN Take 1 Tab by mouth every six (6) hours as needed for Pain. lisinopril-hydroCHLOROthiazide 10-12.5 mg per tablet Commonly known as:  Jose Phalen Take 1 Tab by mouth daily. mometasone 50 mcg/actuation nasal spray Commonly known as:  NASONEX  
2 Sprays by Both Nostrils route daily. montelukast 10 mg tablet Commonly known as:  SINGULAIR  
take 1 tablet by mouth once daily  
  
 predniSONE 20 mg tablet Commonly known as:  Ligia Ringer 3qd for 3 days, then 2qd for 3 days then 1qd for 3 days, then 1/2 every day for 4 days. SYMBICORT 160-4.5 mcg/actuation HFA inhaler Generic drug:  budesonide-formoterol Take 2 Puffs by inhalation two (2) times a day. * Notice: This list has 4 medication(s) that are the same as other medications prescribed for you. Read the directions carefully, and ask your doctor or other care provider to review them with you. Follow-up Instructions Return in about 1 month (around 7/6/2017) for Dr Belén Garrett. Patient Instructions Skin Abscess: Care Instructions Your Care Instructions A skin abscess is a bacterial infection that forms a pocket of pus. A boil is a kind of skin abscess. The doctor may have cut an opening in the abscess so that the pus can drain out. You may have gauze in the cut so that the abscess will stay open and keep draining. You may need antibiotics. You will need to follow up with your doctor to make sure the infection has gone away. The doctor has checked you carefully, but problems can develop later. If you notice any problems or new symptoms, get medical treatment right away. Follow-up care is a key part of your treatment and safety.  Be sure to make and go to all appointments, and call your doctor if you are having problems. It's also a good idea to know your test results and keep a list of the medicines you take. How can you care for yourself at home? · Apply warm and dry compresses, a heating pad set on low, or a hot water bottle 3 or 4 times a day for pain. Keep a cloth between the heat source and your skin. · If your doctor prescribed antibiotics, take them as directed. Do not stop taking them just because you feel better. You need to take the full course of antibiotics. · Take pain medicines exactly as directed. ¨ If the doctor gave you a prescription medicine for pain, take it as prescribed. ¨ If you are not taking a prescription pain medicine, ask your doctor if you can take an over-the-counter medicine. · Keep your bandage clean and dry. Change the bandage whenever it gets wet or dirty, or at least one time a day. · If the abscess was packed with gauze: 
¨ Keep follow-up appointments to have the gauze changed or removed. If the doctor instructed you to remove the gauze, gently pull out all of the gauze when your doctor tells you to. ¨ After the gauze is removed, soak the area in warm water for 15 to 20 minutes 2 times a day, until the wound closes. When should you call for help? Call your doctor now or seek immediate medical care if: 
· You have signs of worsening infection, such as: 
¨ Increased pain, swelling, warmth, or redness. ¨ Red streaks leading from the infected skin. ¨ Pus draining from the wound. ¨ A fever. Watch closely for changes in your health, and be sure to contact your doctor if: 
· You do not get better as expected. Where can you learn more? Go to http://francie-ruchi.info/. Enter J395 in the search box to learn more about \"Skin Abscess: Care Instructions. \" Current as of: October 13, 2016 Content Version: 11.2 © 7451-1788 [employer].  Care instructions adapted under license by [employer] (which disclaims liability or warranty for this information). If you have questions about a medical condition or this instruction, always ask your healthcare professional. [address] any warranty or liability for your use of this information. Introducing [facility]! Masha Madison introduces Sporthold patient portal. Now you can access parts of your medical record, email your doctor's office, and request medication refills online. 1. In your internet browser, go to [email or web address] 2. Click on the First Time User? Click Here link in the Sign In box. You will see the New Member Sign Up page. 3. Enter your Sporthold Access Code exactly as it appears below. You will not need to use this code after youve completed the sign-up process. If you do not sign up before the expiration date, you must request a new code. · Sporthold Access Code: [email or web address] Expires: 8/30/2017  2:59 PM 
 
4. Enter the last four digits of your Social Security Number (xxxx) and Date of Birth (mm/dd/yyyy) as indicated and click Submit. You will be taken to the next sign-up page. 5. Create a Sporthold ID. This will be your Sporthold login ID and cannot be changed, so think of one that is secure and easy to remember. 6. Create a Sporthold password. You can change your password at any time. 7. Enter your Password Reset Question and Answer. This can be used at a later time if you forget your password. 8. Enter your e-mail address. You will receive e-mail notification when new information is available in [address] 9. Click Sign Up. You can now view and download portions of your medical record. 10. Click the Download Summary menu link to download a portable copy of your medical information. If you have questions, please visit the Frequently Asked Questions section of the Sporthold website.  Remember, Sporthold is NOT to be used for urgent needs. For medical emergencies, dial 911. Now available from your iPhone and Android! Please provide this summary of care documentation to your next provider. Your primary care clinician is listed as Phys Other. If you have any questions after today's visit, please call [phone].

## 2017-06-06 NOTE — PATIENT INSTRUCTIONS

## 2017-06-06 NOTE — PROGRESS NOTES
Assessment/Plan:    Radha Rich was seen today for follow-up. Diagnoses and all orders for this visit:    Wound check, abscess  Wound healing well, area of induration now minimal  Continue abx  F/up with Dr Chloé Ortiz as scheduled      Follow-up Disposition:  Return in about 1 month (around 7/6/2017) for Dr Chloé Ortiz. JOSE Wayne expressed understanding of this plan. An AVS was printed and given to the patient.      ----------------------------------------------------------------------    Chief Complaint   Patient presents with    Follow-up     Follow up visit       History of Present Illness:  ER f/up visit for wound check. Right mid abdomen had a large area of cellulitis. Had an i and d in ER and got very little pus out. Has had minimal drainage on his bandage. No fever. Feels that it is much improved      Past Medical History:   Diagnosis Date    Asthma     HTN (hypertension)     Opiate abuse, episodic        Current Outpatient Prescriptions   Medication Sig Dispense Refill    doxycycline (VIBRA-TABS) 100 mg tablet Take 1 Tab by mouth two (2) times a day for 10 days. 20 Tab 0    ibuprofen (MOTRIN) 800 mg tablet Take 1 Tab by mouth every six (6) hours as needed for Pain. 15 Tab 0    budesonide-formoterol (SYMBICORT) 160-4.5 mcg/actuation HFA inhaler Take 2 Puffs by inhalation two (2) times a day.  IBUPROFEN (ADVIL PO) Take  by mouth as needed.  predniSONE (DELTASONE) 20 mg tablet 3qd for 3 days, then 2qd for 3 days then 1qd for 3 days, then 1/2 every day for 4 days. 20 Tab 0    montelukast (SINGULAIR) 10 mg tablet take 1 tablet by mouth once daily 30 Tab 0    cyclobenzaprine (FLEXERIL) 10 mg tablet Take 1 Tab by mouth three (3) times daily as needed for Muscle Spasm(s). 30 Tab 1    mometasone (NASONEX) 50 mcg/actuation nasal spray 2 Sprays by Both Nostrils route daily.  2 Container 5    albuterol (VENTOLIN HFA) 90 mcg/actuation inhaler Take 2 Puffs by inhalation every four (4) hours as needed for Wheezing. 2 Inhaler 6    albuterol (PROVENTIL VENTOLIN) 2.5 mg /3 mL (0.083 %) nebulizer solution 3 mL by Nebulization route every four (4) hours as needed for Wheezing. 1 Package 2    lisinopril-hydroCHLOROthiazide (PRINZIDE, ZESTORETIC) 10-12.5 mg per tablet Take 1 Tab by mouth daily. 30 Tab 5       No Known Allergies    Social History   Substance Use Topics    Smoking status: Former Smoker    Smokeless tobacco: Never Used    Alcohol use 0.0 oz/week     0 Standard drinks or equivalent per week      Comment: occ       Family History   Problem Relation Age of Onset    COPD Mother     Colon Cancer Father       at 76       Physical Exam:     Visit Vitals    BP (!) 142/98 (BP 1 Location: Right arm, BP Patient Position: Sitting)    Pulse 76    Temp 97.9 °F (36.6 °C) (Oral)    Wt 261 lb (118.4 kg)    SpO2 97%    BMI 37.45 kg/m2       A&Ox3  WDWN NAD  Respirations normal and non labored  abd- overweight. Right mid abdomen he has a small bandage covering the area of incision that was made in the ER. There is essentially no surrounding redness. On the bandage there is a spot of serosanguineous fluid.  I am not able to express any more fluid from wound and the area is not indurated

## 2017-06-06 NOTE — PROGRESS NOTES
Coordination of Care  1. Have you been to the ER, urgent care clinic since your last visit? Hospitalized since your last visit? No    2. Have you seen or consulted any other health care providers outside of the [address] [place] [address] since your last visit? Include any pap smears or colon screening. No    Medications  Medication Reconciliation Performed: yes  Patient does not know need refills     Learning Assessment Complete?  yes

## 2017-06-26 DIAGNOSIS — M54.2 NECK PAIN: ICD-10-CM

## 2017-06-26 NOTE — TELEPHONE ENCOUNTER
Received incoming fax from patient's [facility] requesting a refill for Cyclobenzaprine. Routing to the provider for review.  Octavio Walker RN

## 2017-06-27 ENCOUNTER — PATIENT OUTREACH (OUTPATIENT)
Dept: FAMILY MEDICINE CLINIC | Age: 42
End: 2017-06-27

## 2017-06-27 RX ORDER — CYCLOBENZAPRINE HCL 10 MG
10 TABLET ORAL
Qty: 30 TAB | Refills: 1 | Status: SHIPPED | OUTPATIENT
Start: 2017-06-27 | End: 2017-08-17 | Stop reason: SDUPTHER

## 2017-07-06 ENCOUNTER — OFFICE VISIT (OUTPATIENT)
Dept: FAMILY MEDICINE CLINIC | Age: 42
End: 2017-07-06

## 2017-07-06 VITALS
TEMPERATURE: 98.4 F | SYSTOLIC BLOOD PRESSURE: 133 MMHG | OXYGEN SATURATION: 97 % | DIASTOLIC BLOOD PRESSURE: 87 MMHG | BODY MASS INDEX: 34.87 KG/M2 | HEART RATE: 102 BPM | WEIGHT: 243 LBS

## 2017-07-06 DIAGNOSIS — R07.81 RIB PAIN: Primary | ICD-10-CM

## 2017-07-06 RX ORDER — TRAMADOL HYDROCHLORIDE 50 MG/1
50 TABLET ORAL
Qty: 30 TAB | Refills: 0 | Status: SHIPPED | OUTPATIENT
Start: 2017-07-06 | End: 2017-08-17 | Stop reason: ALTCHOICE

## 2017-07-06 RX ORDER — FLUTICASONE PROPIONATE AND SALMETEROL 250; 50 UG/1; UG/1
1 POWDER RESPIRATORY (INHALATION) EVERY 12 HOURS
COMMUNITY
End: 2017-10-19 | Stop reason: SDUPTHER

## 2017-07-06 NOTE — PROGRESS NOTES
HISTORY OF PRESENT ILLNESS  Mya Call is a 42 y.o. male. HPI   Is in this afternoon accompanied by his wife and daughter. He presents today stating he was wrestling or horsing around with another younger eran about 3 weeks ago and feels he fractured some left ribs. Has had a lot of pain and has been taking far more than the recommended doses of ibuprofen. Has been having some improvement but still hurts. Wants something stronger for pain. ROS    Visit Vitals    /87 (BP 1 Location: Left arm, BP Patient Position: Sitting)    Pulse (!) 102    Temp 98.4 °F (36.9 °C) (Oral)    Wt 243 lb (110.2 kg)    SpO2 97%    BMI 34.87 kg/m2     Physical Exam   Constitutional: He appears well-developed and well-nourished. No distress. Cardiovascular: Normal rate, regular rhythm and normal heart sounds. Pulmonary/Chest: Effort normal and breath sounds normal. He has no wheezes. He has no rales. No bruising or swelling of chest wall. No deformity noted. Tender over lower left anterior ribs. ASSESSMENT and PLAN    ICD-10-CM ICD-9-CM    1. Rib pain R07.81 786.50 traMADol (ULTRAM) 50 mg tablet     Discussed management of rib pain. Try heat, sitting up more, sleep agent at night. Will give a limited supply of tramadol for use 1 - 2 times daily just for short time. I reviewed his  report and he has not been getting any scheduled Rx's from anyone lately. Follow up about 6 weeks or sooner if not improving.

## 2017-07-06 NOTE — MR AVS SNAPSHOT
Visit Information Date & Time Provider Department Dept. Phone Encounter #  
 7/6/2017  2:55 PM Gilford Buttery, [address] [facility] [phone] [id number] Follow-up Instructions Return if symptoms worsen or fail to improve. Upcoming Health Maintenance Date Due DTaP/Tdap/Td series (1 - Tdap) 9/18/1995 INFLUENZA AGE 9 TO ADULT 8/1/2017 Allergies as of 7/6/2017  Review Complete On: 7/6/2017 By: Gilford Buttery, MD  
 No Known Allergies Current Immunizations  Reviewed on 1/31/2017 Name Date Influenza Vaccine 9/20/2016 Influenza Vaccine (Quad) PF 10/6/2015  1:10 PM  
 Pneumococcal Polysaccharide (PPSV-23) 1/31/2017 Not reviewed this visit You Were Diagnosed With   
  
 Codes Comments Rib pain    -  Primary ICD-10-CM: R07.81 ICD-9-CM: 786.50 Vitals BP Pulse Temp Weight(growth percentile) SpO2 BMI  
 133/87 (BP 1 Location: Left arm, BP Patient Position: Sitting) (!) 102 98.4 °F (36.9 °C) (Oral) 243 lb (110.2 kg) 97% 34.87 kg/m2 Smoking Status Former Smoker BMI and BSA Data Body Mass Index Body Surface Area 34.87 kg/m 2 2.33 m 2 Preferred Pharmacy Pharmacy Name Phone [address] [place] [address], [facility] [phone] Your Updated Medication List  
  
   
This list is accurate as of: 7/6/17  3:32 PM.  Always use your most recent med list.  
  
  
  
  
 Evaristo De Jesus 250-50 mcg/dose diskus inhaler Generic drug:  fluticasone-salmeterol Take 1 Puff by inhalation every twelve (12) hours. * albuterol 90 mcg/actuation inhaler Commonly known as:  VENTOLIN HFA Take 2 Puffs by inhalation every four (4) hours as needed for Wheezing. * albuterol 2.5 mg /3 mL (0.083 %) nebulizer solution Commonly known as:  PROVENTIL VENTOLIN  
3 mL by Nebulization route every four (4) hours as needed for Wheezing. cyclobenzaprine 10 mg tablet Commonly known as:  FLEXERIL Take 1 Tab by mouth three (3) times daily as needed for Muscle Spasm(s). * ADVIL PO Take  by mouth as needed. * ibuprofen 800 mg tablet Commonly known as:  MOTRIN Take 1 Tab by mouth every six (6) hours as needed for Pain. lisinopril-hydroCHLOROthiazide 10-12.5 mg per tablet Commonly known as:  Dank Meres Take 1 Tab by mouth daily. mometasone 50 mcg/actuation nasal spray Commonly known as:  NASONEX  
2 Sprays by Both Nostrils route daily. montelukast 10 mg tablet Commonly known as:  SINGULAIR  
take 1 tablet by mouth once daily  
  
 traMADol 50 mg tablet Commonly known as:  ULTRAM  
Take 1 Tab by mouth every eight (8) hours as needed for Pain. Max Daily Amount: 150 mg.  
  
 * Notice: This list has 4 medication(s) that are the same as other medications prescribed for you. Read the directions carefully, and ask your doctor or other care provider to review them with you. Prescriptions Printed Refills  
 traMADol (ULTRAM) 50 mg tablet 0 Sig: Take 1 Tab by mouth every eight (8) hours as needed for Pain. Max Daily Amount: 150 mg.  
 Class: Print Route: Oral  
  
Follow-up Instructions Return if symptoms worsen or fail to improve. Introducing [facility]! Jv Sánchez introduces Sensoraide patient portal. Now you can access parts of your medical record, email your doctor's office, and request medication refills online. 1. In your internet browser, go to [email or web address] 2. Click on the First Time User? Click Here link in the Sign In box. You will see the New Member Sign Up page. 3. Enter your Sensoraide Access Code exactly as it appears below. You will not need to use this code after youve completed the sign-up process. If you do not sign up before the expiration date, you must request a new code. · Sensoraide Access Code: [email or web address] Expires: 8/30/2017  2:59 PM 
 
4. Enter the last four digits of your Social Security Number (xxxx) and Date of Birth (mm/dd/yyyy) as indicated and click Submit. You will be taken to the next sign-up page. 5. Create a InvitedHome ID. This will be your InvitedHome login ID and cannot be changed, so think of one that is secure and easy to remember. 6. Create a InvitedHome password. You can change your password at any time. 7. Enter your Password Reset Question and Answer. This can be used at a later time if you forget your password. 8. Enter your e-mail address. You will receive e-mail notification when new information is available in [address] 9. Click Sign Up. You can now view and download portions of your medical record. 10. Click the Download Summary menu link to download a portable copy of your medical information. If you have questions, please visit the Frequently Asked Questions section of the InvitedHome website. Remember, InvitedHome is NOT to be used for urgent needs. For medical emergencies, dial 911. Now available from your iPhone and Android! Please provide this summary of care documentation to your next provider. Your primary care clinician is listed as Phys Other. If you have any questions after today's visit, please call [phone].

## 2017-07-06 NOTE — PROGRESS NOTES
Coordination of Care  1. Have you been to the ER, urgent care clinic since your last visit? Hospitalized since your last visit? No    2. Have you seen or consulted any other health care providers outside of the [address] [place] [address] since your last visit? Include any pap smears or colon screening. No    Medications  Medication Reconciliation Performed: no  Patient does not need refills     Learning Assessment Complete?  yes

## 2017-08-17 ENCOUNTER — OFFICE VISIT (OUTPATIENT)
Dept: FAMILY MEDICINE CLINIC | Age: 42
End: 2017-08-17

## 2017-08-17 VITALS
DIASTOLIC BLOOD PRESSURE: 61 MMHG | WEIGHT: 260 LBS | HEIGHT: 70 IN | TEMPERATURE: 98.8 F | SYSTOLIC BLOOD PRESSURE: 99 MMHG | HEART RATE: 122 BPM | BODY MASS INDEX: 37.22 KG/M2

## 2017-08-17 DIAGNOSIS — R11.0 NAUSEA: ICD-10-CM

## 2017-08-17 DIAGNOSIS — R25.2 MUSCLE CRAMPS: ICD-10-CM

## 2017-08-17 DIAGNOSIS — J20.9 ACUTE BRONCHITIS, UNSPECIFIED ORGANISM: Primary | ICD-10-CM

## 2017-08-17 PROBLEM — L03.818 CELLULITIS AND ABSCESS OF OTHER SPECIFIED SITE: Status: RESOLVED | Noted: 2017-06-06 | Resolved: 2017-08-17

## 2017-08-17 PROBLEM — L02.818 CELLULITIS AND ABSCESS OF OTHER SPECIFIED SITE: Status: RESOLVED | Noted: 2017-06-06 | Resolved: 2017-08-17

## 2017-08-17 RX ORDER — AZITHROMYCIN 250 MG/1
TABLET, FILM COATED ORAL
Qty: 6 TAB | Refills: 0 | Status: SHIPPED | OUTPATIENT
Start: 2017-08-17 | End: 2017-10-19 | Stop reason: ALTCHOICE

## 2017-08-17 RX ORDER — CYCLOBENZAPRINE HCL 10 MG
10 TABLET ORAL
Qty: 30 TAB | Refills: 1 | Status: SHIPPED | OUTPATIENT
Start: 2017-08-17 | End: 2017-10-19 | Stop reason: SDUPTHER

## 2017-08-17 RX ORDER — PROMETHAZINE HYDROCHLORIDE 25 MG/1
25 TABLET ORAL
Qty: 20 TAB | Refills: 0 | Status: SHIPPED | OUTPATIENT
Start: 2017-08-17 | End: 2017-10-19 | Stop reason: ALTCHOICE

## 2017-08-17 NOTE — PROGRESS NOTES
HISTORY OF PRESENT ILLNESS  Lilibeth Flores is a 42 y.o. male. HPI   Began coughing up more purulent sputum about 2 weeks ago. Muscles are cramping, has had headache and nausea with some vomiting this AM.   No fever. ROS    Visit Vitals    BP 99/61 (BP 1 Location: Left arm)    Pulse (!) 122    Temp 98.8 °F (37.1 °C) (Oral)    Ht 5' 10\" (1.778 m)    Wt 260 lb (117.9 kg)    BMI 37.31 kg/m2     Physical Exam   Constitutional: He appears well-developed and well-nourished. He is not distressed but appears to be feeling poorly and is acutely ill. Cardiovascular: Normal rate, regular rhythm and normal heart sounds. Pulmonary/Chest: Effort normal. He has no wheezes. He has rhonchi (scattered on left side mainly). He has no rales. Coughing occasionally during the visit. Vitals reviewed. ASSESSMENT and PLAN    ICD-10-CM ICD-9-CM    1. Acute bronchitis, unspecified organism J20.9 466.0 azithromycin (ZITHROMAX Z-TIMA) 250 mg tablet   2. Muscle cramps R25.2 729.82 cyclobenzaprine (FLEXERIL) 10 mg tablet   3. Nausea R11.0 787.02 promethazine (PHENERGAN) 25 mg tablet       Started on Zithromax for his infection. Refill given of Flexeril for his chronic intermittent muscle cramps. Suggested we investigate this more later when he is well after this episode. Follow-up Disposition:  Return in about 2 weeks (around 8/31/2017). Sooner if not improving or worse.

## 2017-08-17 NOTE — PROGRESS NOTES
Coordination of Care  1. Have you been to the ER, urgent care clinic since your last visit? Hospitalized since your last visit? No    2. Have you seen or consulted any other health care providers outside of the [address] [place] [address] since your last visit? Include any pap smears or colon screening. No    Medications  Medication Reconciliation Performed: no  Patient does need refills     Learning Assessment Complete?  yes

## 2017-08-17 NOTE — MR AVS SNAPSHOT
Visit Information Date & Time Provider Department Dept. Phone Encounter #  
 8/17/2017  3:15 PM Nikki Matt, [address] 745 [id number] Follow-up Instructions Return in about 2 weeks (around 8/31/2017). Upcoming Health Maintenance Date Due DTaP/Tdap/Td series (1 - Tdap) 9/18/1995 INFLUENZA AGE 9 TO ADULT 8/1/2017 Allergies as of 8/17/2017  Review Complete On: 8/17/2017 By: Nikki Matt MD  
 No Known Allergies Current Immunizations  Reviewed on 1/31/2017 Name Date Influenza Vaccine 9/20/2016 Influenza Vaccine (Quad) PF 10/6/2015  1:10 PM  
 Pneumococcal Polysaccharide (PPSV-23) 1/31/2017 Not reviewed this visit You Were Diagnosed With   
  
 Codes Comments Acute bronchitis, unspecified organism    -  Primary ICD-10-CM: J20.9 ICD-9-CM: 466.0 Muscle cramps     ICD-10-CM: R25.2 ICD-9-CM: 729.82 Nausea     ICD-10-CM: R11.0 ICD-9-CM: 787.02 Vitals BP Pulse Temp Height(growth percentile) Weight(growth percentile) BMI  
 99/61 (BP 1 Location: Left arm) (!) 122 98.8 °F (37.1 °C) (Oral) 5' 10\" (1.778 m) 260 lb (117.9 kg) 37.31 kg/m2 Smoking Status Never Smoker Vitals History BMI and BSA Data Body Mass Index Body Surface Area  
 37.31 kg/m 2 2.41 m 2 Preferred Pharmacy Pharmacy Name Phone [facility] [address] [place] [address], [facility] [address] [phone] Your Updated Medication List  
  
   
This list is accurate as of: 8/17/17  4:02 PM.  Always use your most recent med list.  
  
  
  
  
 Geraldean Pyo 250-50 mcg/dose diskus inhaler Generic drug:  fluticasone-salmeterol Take 1 Puff by inhalation every twelve (12) hours. * albuterol 90 mcg/actuation inhaler Commonly known as:  VENTOLIN HFA Take 2 Puffs by inhalation every four (4) hours as needed for Wheezing. * albuterol 2.5 mg /3 mL (0.083 %) nebulizer solution Commonly known as:  PROVENTIL VENTOLIN  
3 mL by Nebulization route every four (4) hours as needed for Wheezing. azithromycin 250 mg tablet Commonly known as:  Camillia Canal Take two tablets today then one tablet daily  
  
 cyclobenzaprine 10 mg tablet Commonly known as:  FLEXERIL Take 1 Tab by mouth three (3) times daily as needed for Muscle Spasm(s). * ADVIL PO Take  by mouth as needed. * ibuprofen 800 mg tablet Commonly known as:  MOTRIN Take 1 Tab by mouth every six (6) hours as needed for Pain. lisinopril-hydroCHLOROthiazide 10-12.5 mg per tablet Commonly known as:  Steven Pelt Take 1 Tab by mouth daily. mometasone 50 mcg/actuation nasal spray Commonly known as:  NASONEX  
2 Sprays by Both Nostrils route daily. montelukast 10 mg tablet Commonly known as:  SINGULAIR  
take 1 tablet by mouth once daily  
  
 promethazine 25 mg tablet Commonly known as:  PHENERGAN Take 1 Tab by mouth every six (6) hours as needed for Nausea. * Notice: This list has 4 medication(s) that are the same as other medications prescribed for you. Read the directions carefully, and ask your doctor or other care provider to review them with you. Prescriptions Sent to Pharmacy Refills  
 azithromycin (ZITHROMAX Z-TIMA) 250 mg tablet 0 Sig: Take two tablets today then one tablet daily Class: Normal  
 Pharmacy: [address] [place] [address], [address] [facility] Ph #: [phone]  
 cyclobenzaprine (FLEXERIL) 10 mg tablet 1 Sig: Take 1 Tab by mouth three (3) times daily as needed for Muscle Spasm(s). Class: Normal  
 [id number] [address] [facility], [address] [facility] Ph #: [phone] Route: Oral  
 promethazine (PHENERGAN) 25 mg tablet 0 Sig: Take 1 Tab by mouth every six (6) hours as needed for Nausea.   
 Class: Normal  
 [facility] [address], [address] [facility] #: [phone] Route: Oral  
  
Follow-up Instructions Return in about 2 weeks (around 8/31/2017). Introducing [facility] SERVICES! Willadean Saint introduces BUKA patient portal. Now you can access parts of your medical record, email your doctor's office, and request medication refills online. 1. In your internet browser, go to [email or web address] 2. Click on the First Time User? Click Here link in the Sign In box. You will see the New Member Sign Up page. 3. Enter your BUKA Access Code exactly as it appears below. You will not need to use this code after youve completed the sign-up process. If you do not sign up before the expiration date, you must request a new code. · BUKA Access Code: [email or web address] Expires: 8/30/2017  2:59 PM 
 
4. Enter the last four digits of your Social Security Number (xxxx) and Date of Birth (mm/dd/yyyy) as indicated and click Submit. You will be taken to the next sign-up page. 5. Create a BUKA ID. This will be your BUKA login ID and cannot be changed, so think of one that is secure and easy to remember. 6. Create a BUKA password. You can change your password at any time. 7. Enter your Password Reset Question and Answer. This can be used at a later time if you forget your password. 8. Enter your e-mail address. You will receive e-mail notification when new information is available in [address] 9. Click Sign Up. You can now view and download portions of your medical record. 10. Click the Download Summary menu link to download a portable copy of your medical information. If you have questions, please visit the Frequently Asked Questions section of the BUKA website. Remember, BUKA is NOT to be used for urgent needs. For medical emergencies, dial 911. Now available from your iPhone and Android! Please provide this summary of care documentation to your next provider. Your primary care clinician is listed as Jennie Otto. If you have any questions after today's visit, please call [phone].

## 2017-09-26 ENCOUNTER — TELEPHONE (OUTPATIENT)
Dept: FAMILY MEDICINE CLINIC | Age: 42
End: 2017-09-26

## 2017-09-26 NOTE — TELEPHONE ENCOUNTER
Pt called to say he is out of his Advair and cannot refill at [facility] as his f/s is out of date. \"by the time I do that I will be in the hospital, I need my inhaler\". Asking if we know of anywhere he can get an inhaler at a reasonable price. Spoke to the patient, advised him if he can get a notarized support letter and bring it to the clinic we can fill out [facility] application and fax it so he can start getting meds prior to his f/s at [facility]. Pt stated he will try to get his friend to get a notarized letter and he will bring it to the clinic. Paperwork is in the nurses drawer to be completed.

## 2017-09-27 ENCOUNTER — CLINICAL SUPPORT (OUTPATIENT)
Dept: FAMILY MEDICINE CLINIC | Age: 42
End: 2017-09-27

## 2017-09-27 DIAGNOSIS — Z71.9 COUNSELED BY NURSE: Primary | ICD-10-CM

## 2017-09-27 NOTE — PROGRESS NOTES
Pt came to clinic today to complete [facility] FA application. He had talked to Philip Carter about this over the phone. He brought a notarized letter today and I did application for pt and faxed it to [facility].  Leann Jimenez RN

## 2017-09-29 NOTE — TELEPHONE ENCOUNTER
Patient filled out [facility] f/s, saw completed form that had notarized letter that was faxed to [facility] already. Génesis Guidry said he received this. Form left on financial screening desk for scanning into patient chart.

## 2017-10-06 DIAGNOSIS — J45.40 ASTHMA, CHRONIC, MODERATE PERSISTENT, UNCOMPLICATED: ICD-10-CM

## 2017-10-06 RX ORDER — MONTELUKAST SODIUM 10 MG/1
TABLET ORAL
Qty: 30 TAB | Refills: 5 | Status: SHIPPED | OUTPATIENT
Start: 2017-10-06 | End: 2017-10-19 | Stop reason: SDUPTHER

## 2017-10-19 ENCOUNTER — OFFICE VISIT (OUTPATIENT)
Dept: FAMILY MEDICINE CLINIC | Age: 42
End: 2017-10-19

## 2017-10-19 VITALS
OXYGEN SATURATION: 96 % | HEIGHT: 70 IN | SYSTOLIC BLOOD PRESSURE: 119 MMHG | HEART RATE: 106 BPM | DIASTOLIC BLOOD PRESSURE: 88 MMHG | TEMPERATURE: 97.9 F | BODY MASS INDEX: 36.22 KG/M2 | WEIGHT: 253 LBS

## 2017-10-19 DIAGNOSIS — J45.40 ASTHMA, CHRONIC, MODERATE PERSISTENT, UNCOMPLICATED: ICD-10-CM

## 2017-10-19 DIAGNOSIS — Z23 ENCOUNTER FOR IMMUNIZATION: ICD-10-CM

## 2017-10-19 DIAGNOSIS — J20.9 ACUTE BRONCHITIS, UNSPECIFIED ORGANISM: ICD-10-CM

## 2017-10-19 DIAGNOSIS — J30.2 CHRONIC SEASONAL ALLERGIC RHINITIS, UNSPECIFIED TRIGGER: ICD-10-CM

## 2017-10-19 DIAGNOSIS — J45.40 MODERATE PERSISTENT CHRONIC ASTHMA WITHOUT COMPLICATION: ICD-10-CM

## 2017-10-19 DIAGNOSIS — R25.2 MUSCLE CRAMPS: ICD-10-CM

## 2017-10-19 DIAGNOSIS — I10 ESSENTIAL HYPERTENSION WITH GOAL BLOOD PRESSURE LESS THAN 140/90: ICD-10-CM

## 2017-10-19 DIAGNOSIS — Z00.00 ROUTINE MEDICAL EXAM: ICD-10-CM

## 2017-10-19 DIAGNOSIS — J45.41 MODERATE PERSISTENT ASTHMA WITH EXACERBATION: Primary | ICD-10-CM

## 2017-10-19 RX ORDER — ALBUTEROL SULFATE 90 UG/1
2 AEROSOL, METERED RESPIRATORY (INHALATION)
Qty: 2 INHALER | Refills: 6 | Status: SHIPPED | OUTPATIENT
Start: 2017-10-19

## 2017-10-19 RX ORDER — CYCLOBENZAPRINE HCL 10 MG
10 TABLET ORAL
Qty: 30 TAB | Refills: 1 | Status: SHIPPED | OUTPATIENT
Start: 2017-10-19 | End: 2018-01-25 | Stop reason: SDUPTHER

## 2017-10-19 RX ORDER — MONTELUKAST SODIUM 10 MG/1
TABLET ORAL
Qty: 90 TAB | Refills: 3 | Status: SHIPPED | OUTPATIENT
Start: 2017-10-19 | End: 2018-08-30 | Stop reason: SDUPTHER

## 2017-10-19 RX ORDER — LISINOPRIL AND HYDROCHLOROTHIAZIDE 10; 12.5 MG/1; MG/1
1 TABLET ORAL DAILY
Qty: 90 TAB | Refills: 3 | Status: SHIPPED | OUTPATIENT
Start: 2017-10-19 | End: 2018-05-16

## 2017-10-19 RX ORDER — FLUTICASONE PROPIONATE AND SALMETEROL 250; 50 UG/1; UG/1
1 POWDER RESPIRATORY (INHALATION) EVERY 12 HOURS
Qty: 3 INHALER | Refills: 3 | Status: SHIPPED | OUTPATIENT
Start: 2017-10-19 | End: 2018-12-07 | Stop reason: SDUPTHER

## 2017-10-19 RX ORDER — PREDNISONE 10 MG/1
TABLET ORAL
Qty: 12 TAB | Refills: 3 | Status: SHIPPED | OUTPATIENT
Start: 2017-10-19 | End: 2018-03-01 | Stop reason: SDUPTHER

## 2017-10-19 NOTE — PROGRESS NOTES
Gave vaccine per protocol. Documented in [address]. Gave patient VIS information sheet and reviewed instructions regarding possible adverse side effects and allergic reactions. No adverse reaction noted at time of discharge.  Nydia Watts RN

## 2017-10-19 NOTE — MR AVS SNAPSHOT
Visit Information Date & Time Provider Department Dept. Phone Encounter #  
 10/19/2017  1:15 PM Steve Emelina [place] [address]  Follow-up Instructions Return in about 6 weeks (around 11/30/2017). Upcoming Health Maintenance Date Due DTaP/Tdap/Td series (1 - Tdap) 9/18/1995 INFLUENZA AGE 9 TO ADULT 8/1/2017 Allergies as of 10/19/2017  Review Complete On: 10/19/2017 By: Steve Tarango NP No Known Allergies Current Immunizations  Reviewed on 1/31/2017 Name Date Influenza Vaccine 9/20/2016 Influenza Vaccine (Quad) PF 10/6/2015  1:10 PM  
 Pneumococcal Polysaccharide (PPSV-23) 1/31/2017 Not reviewed this visit You Were Diagnosed With   
  
 Codes Comments Moderate persistent asthma with exacerbation    -  Primary ICD-10-CM: J45.41 
ICD-9-CM: 493.92 Chronic seasonal allergic rhinitis, unspecified trigger     ICD-10-CM: J30.2 ICD-9-CM: 477.8 Acute bronchitis, unspecified organism     ICD-10-CM: J20.9 ICD-9-CM: 466.0 Essential hypertension with goal blood pressure less than 140/90     ICD-10-CM: I10 
ICD-9-CM: 401.9 Muscle cramps     ICD-10-CM: R25.2 ICD-9-CM: 729.82 Asthma, chronic, moderate persistent, uncomplicated     POT-02-NU: J45.40 ICD-9-CM: 493.90 Moderate persistent chronic asthma without complication     [facility]: J45.40 ICD-9-CM: 493.90 Routine medical exam     ICD-10-CM: Z00.00 ICD-9-CM: V70.0 Vitals BP Pulse Temp Height(growth percentile) Weight(growth percentile) SpO2  
 119/88 (BP 1 Location: Left arm) (!) 106 97.9 °F (36.6 °C) (Oral) 5' 10\" (1.778 m) 253 lb (114.8 kg) 96% BMI Smoking Status 36.3 kg/m2 Never Smoker Vitals History BMI and BSA Data Body Mass Index Body Surface Area  
 36.3 kg/m 2 2.38 m 2 Preferred Pharmacy Pharmacy Name Phone [address] [phone] Your Updated Medication List  
  
   
This list is accurate as of: 10/19/17  1:54 PM.  Always use your most recent med list.  
  
  
  
  
 * albuterol 2.5 mg /3 mL (0.083 %) nebulizer solution Commonly known as:  PROVENTIL VENTOLIN  
3 mL by Nebulization route every four (4) hours as needed for Wheezing. * albuterol 90 mcg/actuation inhaler Commonly known as:  VENTOLIN HFA Take 2 Puffs by inhalation every four (4) hours as needed for Wheezing. cyclobenzaprine 10 mg tablet Commonly known as:  FLEXERIL Take 1 Tab by mouth three (3) times daily as needed for Muscle Spasm(s). fluticasone-salmeterol 250-50 mcg/dose diskus inhaler Commonly known as:  ADVAIR DISKUS Take 1 Puff by inhalation every twelve (12) hours. * ADVIL PO Take  by mouth as needed. * ibuprofen 800 mg tablet Commonly known as:  MOTRIN Take 1 Tab by mouth every six (6) hours as needed for Pain. lisinopril-hydroCHLOROthiazide 10-12.5 mg per tablet Commonly known as:  Kathalene Rockers Take 1 Tab by mouth daily. mometasone 50 mcg/actuation nasal spray Commonly known as:  NASONEX  
2 Sprays by Both Nostrils route daily. montelukast 10 mg tablet Commonly known as:  SINGULAIR  
take 1 tablet by mouth once daily  
  
 predniSONE 10 mg tablet Commonly known as:  Zachariah Lever Take 40mg daily x 3 days at the onset of asthma exacerbation. * Notice: This list has 4 medication(s) that are the same as other medications prescribed for you. Read the directions carefully, and ask your doctor or other care provider to review them with you. Prescriptions Printed Refills  
 montelukast (SINGULAIR) 10 mg tablet 3 Sig: take 1 tablet by mouth once daily Class: Print Prescriptions Sent to Pharmacy Refills lisinopril-hydroCHLOROthiazide (PRINZIDE, ZESTORETIC) 10-12.5 mg per tablet 3 Sig: Take 1 Tab by mouth daily. Class: Normal  
 Pharmacy: [address] [place] [address], [address] [place] [address] Ph #: [phone] Route: Oral  
 cyclobenzaprine (FLEXERIL) 10 mg tablet 1 Sig: Take 1 Tab by mouth three (3) times daily as needed for Muscle Spasm(s). Class: Normal  
 Pharmacy: [address] [place] [address], [address] [place] [address] Ph #: [phone] Route: Oral  
 fluticasone-salmeterol (ADVAIR DISKUS) 250-50 mcg/dose diskus inhaler 3 Sig: Take 1 Puff by inhalation every twelve (12) hours. Class: Normal  
 Pharmacy: [address] [place] [address] Ph #: [phone] Route: Inhalation  
 albuterol (VENTOLIN HFA) 90 mcg/actuation inhaler 6 Sig: Take 2 Puffs by inhalation every four (4) hours as needed for Wheezing. Class: Normal  
 Pharmacy: [address] [place] [address] Ph #: [phone] Route: Inhalation  
 predniSONE (DELTASONE) 10 mg tablet 3 Sig: Take 40mg daily x 3 days at the onset of asthma exacerbation. Class: Normal  
 Pharmacy: [address] [place] [address], [address] [place] [address] Ph #: [phone] Follow-up Instructions Return in about 6 weeks (around 11/30/2017). To-Do List   
 10/19/2017 Lab:  CBC W/O DIFF   
  
 10/19/2017 Lab:  HEMOGLOBIN A1C WITH EAG   
  
 10/19/2017 Lab:  LIPID PANEL   
  
 10/19/2017 Lab:  METABOLIC PANEL, COMPREHENSIVE Patient Instructions Learning About Asthma Triggers What are triggers? When you have asthma, certain things can make your symptoms worse. These are called triggers. They include: · Cigarette smoke or air pollution. · Things you are allergic to, such as: 
¨ Pollen, mold, or dust mites. ¨ Pet hair, skin, or saliva. · Illnesses, like colds, flu, or pneumonia. · Exercise. · Dry, cold air. How do triggers affect asthma? Triggers can make it harder for your lungs to work as they should and can lead to sudden difficulty breathing and other symptoms. When you are around a trigger, an asthma attack is more likely. If your symptoms are severe, you may need emergency treatment or have to go to the hospital for treatment. If you know what your triggers are and can avoid them, you may be able to prevent asthma attacks, reduce how often you have them, and make them less severe. What can you do to avoid triggers? The first thing is to know your triggers. When you are having symptoms, note the things around you that might be causing them. Then look for patterns in what may be triggering your symptoms. Record your triggers on a piece of paper or in an asthma diary. When you have your list of possible triggers, work with your doctor to find ways to avoid them. You also can check how well your lungs are working by measuring your peak expiratory flow (PEF) throughout the day. Your PEF may drop when you are near things that trigger symptoms. Here are some ways to avoid a few common triggers. · Do not smoke or allow others to smoke around you. If you need help quitting, talk to your doctor about stop-smoking programs and medicines. These can increase your chances of quitting for good. · If there is a lot of pollution, pollen, or dust outside, stay at home and keep your windows closed. Use an air conditioner or air filter in your home. Check your local weather report or newspaper for air quality and pollen reports. · Get a flu shot every year. Talk to your doctor about getting a pneumococcal shot. Wash your hands often to prevent infections. · Avoid exercising outdoors in cold weather. If you are outdoors in cold weather, wear a scarf around your face and breathe through your nose. How can you manage an asthma attack? · If you have an asthma action plan, follow the plan. In general: ¨ Use your quick-relief inhaler as directed by your doctor. If your symptoms do not get better after you use your medicine, have someone take you to the emergency room. Call an ambulance if needed. ¨ If your doctor has given you other inhaled medicines or steroid pills, take them as directed. Where can you learn more? Go to Fab.be Enter Q397 in the search box to learn more about \"Learning About Asthma Triggers. \"  
© 8424-3646 Healthwise, Incorporated. Care instructions adapted under license by Leslie Spears (which disclaims liability or warranty for this information). This care instruction is for use with your licensed healthcare professional. If you have questions about a medical condition or this instruction, always ask your healthcare professional. [address] any warranty or liability for your use of this information. Content Version: 89.5.989436; Last Revised: February 23, 2012 Introducing [facility] SERVICES! Leslie Spears introduces [employer] ([place]) patient portal. Now you can access parts of your medical record, email your doctor's office, and request medication refills online. 1. In your internet browser, go to [email or web address] 2. Click on the First Time User? Click Here link in the Sign In box. You will see the New Member Sign Up page. 3. Enter your [employer] Access Code exactly as it appears below. You will not need to use this code after youve completed the sign-up process. If you do not sign up before the expiration date, you must request a new code. · [employer] Access Code: [email or web address] Expires: 1/17/2018  1:54 PM 
 
4. Enter the last four digits of your Social Security Number (xxxx) and Date of Birth (mm/dd/yyyy) as indicated and click Submit. You will be taken to the next sign-up page. 5. Create a [employer]) ID.  This will be your [employer]) login ID and cannot be changed, so think of one that is secure and easy to remember. 6. Create a Brain Sentry password. You can change your password at any time. 7. Enter your Password Reset Question and Answer. This can be used at a later time if you forget your password. 8. Enter your e-mail address. You will receive e-mail notification when new information is available in [address] 9. Click Sign Up. You can now view and download portions of your medical record. 10. Click the Download Summary menu link to download a portable copy of your medical information. If you have questions, please visit the Frequently Asked Questions section of the Brain Sentry website. Remember, Brain Sentry is NOT to be used for urgent needs. For medical emergencies, dial 911. Now available from your iPhone and Android! Please provide this summary of care documentation to your next provider. Your primary care clinician is listed as Mariana Kwon. If you have any questions after today's visit, please call [phone].

## 2017-10-19 NOTE — PATIENT INSTRUCTIONS
Learning About Asthma Triggers  What are triggers? When you have asthma, certain things can make your symptoms worse. These are called triggers. They include:  · Cigarette smoke or air pollution. · Things you are allergic to, such as:  ¨ Pollen, mold, or dust mites. ¨ Pet hair, skin, or saliva. · Illnesses, like colds, flu, or pneumonia. · Exercise. · Dry, cold air. How do triggers affect asthma? Triggers can make it harder for your lungs to work as they should and can lead to sudden difficulty breathing and other symptoms. When you are around a trigger, an asthma attack is more likely. If your symptoms are severe, you may need emergency treatment or have to go to the hospital for treatment. If you know what your triggers are and can avoid them, you may be able to prevent asthma attacks, reduce how often you have them, and make them less severe. What can you do to avoid triggers? The first thing is to know your triggers. When you are having symptoms, note the things around you that might be causing them. Then look for patterns in what may be triggering your symptoms. Record your triggers on a piece of paper or in an asthma diary. When you have your list of possible triggers, work with your doctor to find ways to avoid them. You also can check how well your lungs are working by measuring your peak expiratory flow (PEF) throughout the day. Your PEF may drop when you are near things that trigger symptoms. Here are some ways to avoid a few common triggers. · Do not smoke or allow others to smoke around you. If you need help quitting, talk to your doctor about stop-smoking programs and medicines. These can increase your chances of quitting for good. · If there is a lot of pollution, pollen, or dust outside, stay at home and keep your windows closed. Use an air conditioner or air filter in your home. Check your local weather report or newspaper for air quality and pollen reports.   · Get a flu shot every year. Talk to your doctor about getting a pneumococcal shot. Wash your hands often to prevent infections. · Avoid exercising outdoors in cold weather. If you are outdoors in cold weather, wear a scarf around your face and breathe through your nose. How can you manage an asthma attack? · If you have an asthma action plan, follow the plan. In general:  ¨ Use your quick-relief inhaler as directed by your doctor. If your symptoms do not get better after you use your medicine, have someone take you to the emergency room. Call an ambulance if needed. ¨ If your doctor has given you other inhaled medicines or steroid pills, take them as directed. Where can you learn more? Go to Sumerian.be  Enter M564 in the search box to learn more about \"Learning About Asthma Triggers. \"   © 8523-3826 Healthwise, Incorporated. Care instructions adapted under license by [facility] Explorer.io (which disclaims liability or warranty for this information). This care instruction is for use with your licensed healthcare professional. If you have questions about a medical condition or this instruction, always ask your healthcare professional. [place] [address] any warranty or liability for your use of this information. Content Version: 64.3.526120;  Last Revised: February 23, 2012

## 2017-10-19 NOTE — PROGRESS NOTES
Coordination of Care  1. Have you been to the ER, urgent care clinic since your last visit? Hospitalized since your last visit? No    2. Have you seen or consulted any other health care providers outside of the [address] [place] [address] since your last visit? Include any pap smears or colon screening. No    Medications  Does the patient need refills? YES    Learning Assessment Complete?  yes

## 2017-11-22 ENCOUNTER — OFFICE VISIT (OUTPATIENT)
Dept: FAMILY MEDICINE CLINIC | Age: 42
End: 2017-11-22

## 2017-11-22 ENCOUNTER — HOSPITAL ENCOUNTER (OUTPATIENT)
Dept: LAB | Age: 42
Discharge: HOME | End: 2017-11-22

## 2017-11-22 VITALS
BODY MASS INDEX: 38.02 KG/M2 | SYSTOLIC BLOOD PRESSURE: 149 MMHG | TEMPERATURE: 98.4 F | HEART RATE: 79 BPM | DIASTOLIC BLOOD PRESSURE: 85 MMHG | WEIGHT: 265 LBS

## 2017-11-22 DIAGNOSIS — Z00.00 ROUTINE MEDICAL EXAM: ICD-10-CM

## 2017-11-22 DIAGNOSIS — I10 ESSENTIAL HYPERTENSION: ICD-10-CM

## 2017-11-22 DIAGNOSIS — J45.40 MODERATE PERSISTENT CHRONIC ASTHMA WITHOUT COMPLICATION: Primary | ICD-10-CM

## 2017-11-22 DIAGNOSIS — K04.7 DENTAL INFECTION: ICD-10-CM

## 2017-11-22 LAB
ALBUMIN SERPL-MCNC: 3.9 G/DL (ref 3.5–5)
ALBUMIN/GLOB SERPL: 1.3 {RATIO} (ref 1.1–2.2)
ALP SERPL-CCNC: 83 U/L (ref 45–117)
ALT SERPL-CCNC: 24 U/L (ref 12–78)
ANION GAP SERPL CALC-SCNC: 6 MMOL/L (ref 5–15)
AST SERPL-CCNC: 21 U/L (ref 15–37)
BILIRUB SERPL-MCNC: 1 MG/DL (ref 0.2–1)
BUN SERPL-MCNC: 11 MG/DL (ref 6–20)
BUN/CREAT SERPL: 8 (ref 12–20)
CALCIUM SERPL-MCNC: 9.6 MG/DL (ref 8.5–10.1)
CHLORIDE SERPL-SCNC: 104 MMOL/L (ref 97–108)
CHOLEST SERPL-MCNC: 187 MG/DL
CO2 SERPL-SCNC: 32 MMOL/L (ref 21–32)
CREAT SERPL-MCNC: 1.44 MG/DL (ref 0.7–1.3)
ERYTHROCYTE [DISTWIDTH] IN BLOOD BY AUTOMATED COUNT: 12.7 % (ref 11.5–14.5)
EST. AVERAGE GLUCOSE BLD GHB EST-MCNC: 100 MG/DL
GLOBULIN SER CALC-MCNC: 3 G/DL (ref 2–4)
GLUCOSE SERPL-MCNC: 126 MG/DL (ref 65–100)
HBA1C MFR BLD: 5.1 % (ref 4.2–6.3)
HCT VFR BLD AUTO: 44.9 % (ref 36.6–50.3)
HDLC SERPL-MCNC: 74 MG/DL
HDLC SERPL: 2.5 {RATIO} (ref 0–5)
HGB BLD-MCNC: 14.8 G/DL (ref 12.1–17)
LDLC SERPL CALC-MCNC: 107.2 MG/DL (ref 0–100)
LIPID PROFILE,FLP: ABNORMAL
MCH RBC QN AUTO: 30.1 PG (ref 26–34)
MCHC RBC AUTO-ENTMCNC: 33 G/DL (ref 30–36.5)
MCV RBC AUTO: 91.4 FL (ref 80–99)
PLATELET # BLD AUTO: 252 K/UL (ref 150–400)
POTASSIUM SERPL-SCNC: 4.6 MMOL/L (ref 3.5–5.1)
PROT SERPL-MCNC: 6.9 G/DL (ref 6.4–8.2)
RBC # BLD AUTO: 4.91 M/UL (ref 4.1–5.7)
SODIUM SERPL-SCNC: 142 MMOL/L (ref 136–145)
TRIGL SERPL-MCNC: 29 MG/DL (ref ?–150)
VLDLC SERPL CALC-MCNC: 5.8 MG/DL
WBC # BLD AUTO: 5.8 K/UL (ref 4.1–11.1)

## 2017-11-22 PROCEDURE — 85027 COMPLETE CBC AUTOMATED: CPT | Performed by: NURSE PRACTITIONER

## 2017-11-22 PROCEDURE — 83036 HEMOGLOBIN GLYCOSYLATED A1C: CPT | Performed by: NURSE PRACTITIONER

## 2017-11-22 PROCEDURE — 80061 LIPID PANEL: CPT | Performed by: NURSE PRACTITIONER

## 2017-11-22 PROCEDURE — 80053 COMPREHEN METABOLIC PANEL: CPT | Performed by: NURSE PRACTITIONER

## 2017-11-22 RX ORDER — AMOXICILLIN AND CLAVULANATE POTASSIUM 875; 125 MG/1; MG/1
1 TABLET, FILM COATED ORAL 2 TIMES DAILY
Qty: 20 TAB | Refills: 0 | Status: SHIPPED | OUTPATIENT
Start: 2017-11-22 | End: 2017-12-02

## 2017-11-22 NOTE — PROGRESS NOTES
Subjective:     Chief Complaint   Patient presents with    Follow-up     lab work         He  is a 36 y.o. male who presents for evaluation of asthma. Pt also notes some R upper jaw pain r/t infected tooth. Has noted some gingival swelling. Has pending DDS appt w/ [facility]. Has been using OTC lidocaine gel, heat/ice and PRN Motrin. Denies any flu like S&S, abnormal swelling, N/V/D nor problems swallowing. Has only used PRN albuterol inhaler only 1-2x since LOV. ROS  Gen - no fever/chills  Resp - no dyspnea or cough  CV - no chest pain or ERNANDEZ  Rest per HPI    Past Medical History:   Diagnosis Date    Asthma     HTN (hypertension)     Opiate abuse, episodic      No past surgical history on file. Current Outpatient Prescriptions on File Prior to Visit   Medication Sig Dispense Refill    lisinopril-hydroCHLOROthiazide (PRINZIDE, ZESTORETIC) 10-12.5 mg per tablet Take 1 Tab by mouth daily. 90 Tab 3    cyclobenzaprine (FLEXERIL) 10 mg tablet Take 1 Tab by mouth three (3) times daily as needed for Muscle Spasm(s). 30 Tab 1    montelukast (SINGULAIR) 10 mg tablet take 1 tablet by mouth once daily 90 Tab 3    fluticasone-salmeterol (ADVAIR DISKUS) 250-50 mcg/dose diskus inhaler Take 1 Puff by inhalation every twelve (12) hours. 3 Inhaler 3    albuterol (VENTOLIN HFA) 90 mcg/actuation inhaler Take 2 Puffs by inhalation every four (4) hours as needed for Wheezing. 2 Inhaler 6    predniSONE (DELTASONE) 10 mg tablet Take 40mg daily x 3 days at the onset of asthma exacerbation. 12 Tab 3    ibuprofen (MOTRIN) 800 mg tablet Take 1 Tab by mouth every six (6) hours as needed for Pain. 15 Tab 0    IBUPROFEN (ADVIL PO) Take  by mouth as needed.  mometasone (NASONEX) 50 mcg/actuation nasal spray 2 Sprays by Both Nostrils route daily. 2 Container 5    albuterol (PROVENTIL VENTOLIN) 2.5 mg /3 mL (0.083 %) nebulizer solution 3 mL by Nebulization route every four (4) hours as needed for Wheezing. 1 Package 2     No current facility-administered medications on file prior to visit. Objective:     Vitals:    11/22/17 1043 11/22/17 1047   BP: (!) 147/97 149/85   Pulse: 81 79   Temp: 98.4 °F (36.9 °C)    TempSrc: Oral    Weight: 265 lb (120.2 kg)        Physical Examination:  General appearance - alert, well appearing, and in no distress  Eyes -sclera anicteric  Neck - supple, no significant adenopathy, no thyromegaly  Chest - clear to auscultation, no wheezes, rales or rhonchi, symmetric air entry  Heart - normal rate, regular rhythm, normal S1, S2, no murmurs, rubs, clicks or gallops  Neurological - alert, oriented, no focal findings or movement disorder noted  Noted carious/abscessed tooth in R lower mandible       Assessment/ Plan:   Diagnoses and all orders for this visit:    1. Moderate persistent chronic asthma without complication    2. Essential hypertension    3. Dental infection  -     amoxicillin-clavulanate (AUGMENTIN) 875-125 mg per tablet; Take 1 Tab by mouth two (2) times a day for 10 days. 4. Routine medical exam  -     CBC W/O DIFF  -     METABOLIC PANEL, COMPREHENSIVE  -     HEMOGLOBIN A1C WITH EAG  -     LIPID PANEL        Start Augmentin for tooth. DDS list provided in case Pt has to wait > 2-3 months for [facility] app[facility] at [facility]. Recommend PRN motrin, tylenol and heat/ice. Check baseline labs. Defer Tdap vaccine today r/t Pt's pain. No refills needed at this time per Pt's report. Re-eval in 3-6 months. I have discussed the diagnosis with the patient and the intended plan as seen in the above orders. The patient has received an after-visit summary and questions were answered concerning future plans. I have discussed medication side effects and warnings with the patient as well. The patient verbalizes understanding and agreement with the plan.     Follow-up Disposition: Not on File

## 2017-11-22 NOTE — MR AVS SNAPSHOT
Visit Information Date & Time Provider Department Dept. Phone Encounter #  
 11/22/2017 10:30 AM Ramiro Hernandez NP [facility] [phone] [id number] Follow-up Instructions Return in about 2 months (around 1/22/2018). Upcoming Health Maintenance Date Due DTaP/Tdap/Td series (1 - Tdap) 9/18/1995 Allergies as of 11/22/2017  Review Complete On: 11/22/2017 By: Ramiro Hernandez NP No Known Allergies Current Immunizations  Reviewed on 10/19/2017 Name Date Influenza Vaccine 9/20/2016 Influenza Vaccine (Quad) PF 10/19/2017, 10/6/2015  1:10 PM  
 Pneumococcal Polysaccharide (PPSV-23) 1/31/2017 Not reviewed this visit You Were Diagnosed With   
  
 Codes Comments Moderate persistent chronic asthma without complication    -  Primary ICD-10-CM: J45.40 ICD-9-CM: 493.90 Essential hypertension     ICD-10-CM: I10 
ICD-9-CM: 401.9 Dental infection     ICD-10-CM: K04.7 ICD-9-CM: 522.4 Vitals BP Pulse Temp Weight(growth percentile) BMI Smoking Status 149/85 (BP 1 Location: Right arm, BP Patient Position: Sitting) 79 98.4 °F (36.9 °C) (Oral) 265 lb (120.2 kg) 38.02 kg/m2 Never Smoker Vitals History BMI and BSA Data Body Mass Index Body Surface Area 38.02 kg/m 2 2.44 m 2 Preferred Pharmacy Pharmacy Name Phone [place] [phone] Your Updated Medication List  
  
   
This list is accurate as of: 11/22/17 11:12 AM.  Always use your most recent med list.  
  
  
  
  
 * albuterol 2.5 mg /3 mL (0.083 %) nebulizer solution Commonly known as:  PROVENTIL VENTOLIN  
3 mL by Nebulization route every four (4) hours as needed for Wheezing. * albuterol 90 mcg/actuation inhaler Commonly known as:  VENTOLIN HFA Take 2 Puffs by inhalation every four (4) hours as needed for Wheezing. amoxicillin-clavulanate 875-125 mg per tablet Commonly known as:  AUGMENTIN Take 1 Tab by mouth two (2) times a day for 10 days. cyclobenzaprine 10 mg tablet Commonly known as:  FLEXERIL Take 1 Tab by mouth three (3) times daily as needed for Muscle Spasm(s). fluticasone-salmeterol 250-50 mcg/dose diskus inhaler Commonly known as:  ADVAIR DISKUS Take 1 Puff by inhalation every twelve (12) hours. * ADVIL PO Take  by mouth as needed. * ibuprofen 800 mg tablet Commonly known as:  MOTRIN Take 1 Tab by mouth every six (6) hours as needed for Pain. lisinopril-hydroCHLOROthiazide 10-12.5 mg per tablet Commonly known as:  Frank Moise Take 1 Tab by mouth daily. mometasone 50 mcg/actuation nasal spray Commonly known as:  NASONEX  
2 Sprays by Both Nostrils route daily. montelukast 10 mg tablet Commonly known as:  SINGULAIR  
take 1 tablet by mouth once daily  
  
 predniSONE 10 mg tablet Commonly known as:  Bowman Fossa Take 40mg daily x 3 days at the onset of asthma exacerbation. * Notice: This list has 4 medication(s) that are the same as other medications prescribed for you. Read the directions carefully, and ask your doctor or other care provider to review them with you. Prescriptions Sent to Pharmacy Refills  
 amoxicillin-clavulanate (AUGMENTIN) 875-125 mg per tablet 0 Sig: Take 1 Tab by mouth two (2) times a day for 10 days. Class: Normal  
 Pharmacy: [address], [address] [place] [address] #: [phone] Route: Oral  
  
Follow-up Instructions Return in about 2 months (around 1/22/2018). Patient Instructions Learning About Dental Care What is basic dental care? Basic dental care involves brushing and flossing your teeth regularly to remove plaque.  Plaque is a thin film of bacteria that sticks to teeth above and below the gum line. It can build up and harden into tartar, which makes it harder to give the teeth a good cleaning. Tartar usually has to be removed by a dental hygienist. 
The bacteria in plaque use sugars to make acids. These acids can damage the gums and teeth. Be sure to see your dentist and dental hygienist for regular checkups and cleanings. How can dental care affect your health? Practicing basic dental care: · Removes plaque that can cause gum disease and tooth decay. Tooth decay can lead to a hole in the tooth (cavity). · Helps prevent bad breath. Brushing and flossing rid your mouth of the bacteria that cause bad breath. · Helps keep teeth white by preventing staining from food, drinks, and tobacco. 
· Makes it possible for your teeth to last a lifetime. What can you do to prevent dental problems? · Brush your teeth twice a day, in the morning and at night. ¨ Use a toothbrush with soft, rounded-end bristles and a head that is small enough to reach all parts of your teeth and mouth. ¨ Replace your toothbrush every 3 to 4 months. ¨ Use a fluoride toothpaste. ¨ Place the brush at a 45-degree angle where the teeth meet the gums. Press firmly, and gently rock the brush back and forth using small circular movements. ¨ Brush chewing surfaces vigorously with short back-and-forth strokes. ¨ Brush your tongue from back to front. · Floss at least once a day. Choose the type and flavor you like best. 
· Schedule checkups and cleanings as often as your dentist recommends it. · Eat a healthy diet to help keep your gums healthy and your teeth strong. Choose foods that are good for your teeth, such as whole grains, vegetables, fruits, and foods that are low in saturated fat and sodium. Mozzarella and other cheeses, peanuts, yogurt, and milk are good for your teeth. Sugar-free chewing gum (especially gum that contains xylitol) is also a good choice. · Avoid foods that contain a lot of sugar, especially sticky, sweet foods like taffy. · Don't snack before bedtime. Food left on the teeth is more likely to cause tooth decay overnight. · Don't smoke or use smokeless tobacco. Tobacco can make tooth decay worse. If you need help quitting, talk to your doctor about stop-smoking programs and medicines. These can increase your chances of quitting for good. Where can you learn more? Go to http://francie-ruchi.info/. Enter I594 in the search box to learn more about \"Learning About Dental Care. \" Current as of: May 12, 2017 Content Version: 11.4 © 3106-7521 [employer]. Care instructions adapted under license by [employer] (which disclaims liability or warranty for this information). If you have questions about a medical condition or this instruction, always ask your healthcare professional. [address] any warranty or liability for your use of this information. Introducing [facility]! Ricardo Clarke introduces Relux patient portal. Now you can access parts of your medical record, email your doctor's office, and request medication refills online. 1. In your internet browser, go to [email or web address] 2. Click on the First Time User? Click Here link in the Sign In box. You will see the New Member Sign Up page. 3. Enter your Relux Access Code exactly as it appears below. You will not need to use this code after youve completed the sign-up process. If you do not sign up before the expiration date, you must request a new code. · Relux Access Code: [email or web address] Expires: 1/17/2018 12:54 PM 
 
4. Enter the last four digits of your Social Security Number (xxxx) and Date of Birth (mm/dd/yyyy) as indicated and click Submit. You will be taken to the next sign-up page. 5. Create a Stamplayt ID.  This will be your Relux login ID and cannot be changed, so think of one that is secure and easy to remember. 6. Create a [employer] [facility] [employer] [facility] password. You can change your password at any time. 7. Enter your Password Reset Question and Answer. This can be used at a later time if you forget your password. 8. Enter your e-mail address. You will receive e-mail notification when new information is available in [address] 9. Click Sign Up. You can now view and download portions of your medical record. 10. Click the Download Summary menu link to download a portable copy of your medical information. If you have questions, please visit the Frequently Asked Questions section of the [employer] [facility] [employer] [facility] website. Remember, [employer] [facility] [employer] [facility] is NOT to be used for urgent needs. For medical emergencies, dial 911. Now available from your iPhone and Android! Please provide this summary of care documentation to your next provider. Your primary care clinician is listed as Bee Ordonez. If you have any questions after today's visit, please call [phone].

## 2017-11-22 NOTE — PATIENT INSTRUCTIONS
Learning About Dental Care  What is basic dental care? Basic dental care involves brushing and flossing your teeth regularly to remove plaque. Plaque is a thin film of bacteria that sticks to teeth above and below the gum line. It can build up and harden into tartar, which makes it harder to give the teeth a good cleaning. Tartar usually has to be removed by a dental hygienist.  The bacteria in plaque use sugars to make acids. These acids can damage the gums and teeth. Be sure to see your dentist and dental hygienist for regular checkups and cleanings. How can dental care affect your health? Practicing basic dental care:  · Removes plaque that can cause gum disease and tooth decay. Tooth decay can lead to a hole in the tooth (cavity). · Helps prevent bad breath. Brushing and flossing rid your mouth of the bacteria that cause bad breath. · Helps keep teeth white by preventing staining from food, drinks, and tobacco.  · Makes it possible for your teeth to last a lifetime. What can you do to prevent dental problems? · Brush your teeth twice a day, in the morning and at night. ¨ Use a toothbrush with soft, rounded-end bristles and a head that is small enough to reach all parts of your teeth and mouth. ¨ Replace your toothbrush every 3 to 4 months. ¨ Use a fluoride toothpaste. ¨ Place the brush at a 45-degree angle where the teeth meet the gums. Press firmly, and gently rock the brush back and forth using small circular movements. ¨ Brush chewing surfaces vigorously with short back-and-forth strokes. ¨ Brush your tongue from back to front. · Floss at least once a day. Choose the type and flavor you like best.  · Schedule checkups and cleanings as often as your dentist recommends it. · Eat a healthy diet to help keep your gums healthy and your teeth strong. Choose foods that are good for your teeth, such as whole grains, vegetables, fruits, and foods that are low in saturated fat and sodium. Mozzarella and other cheeses, peanuts, yogurt, and milk are good for your teeth. Sugar-free chewing gum (especially gum that contains xylitol) is also a good choice. · Avoid foods that contain a lot of sugar, especially sticky, sweet foods like taffy. · Don't snack before bedtime. Food left on the teeth is more likely to cause tooth decay overnight. · Don't smoke or use smokeless tobacco. Tobacco can make tooth decay worse. If you need help quitting, talk to your doctor about stop-smoking programs and medicines. These can increase your chances of quitting for good. Where can you learn more? Go to http://francie-ruchi.info/. Enter G888 in the search box to learn more about \"Learning About Dental Care. \"  Current as of: May 12, 2017  Content Version: 11.4  © 5433-7580 [employer]. Care instructions adapted under license by [employer] (which disclaims liability or warranty for this information). If you have questions about a medical condition or this instruction, always ask your healthcare professional. [address] any warranty or liability for your use of this information.

## 2017-11-22 NOTE — PROGRESS NOTES
Coordination of Care  1. Have you been to the ER, urgent care clinic since your last visit? Hospitalized since your last visit? No    2. Have you seen or consulted any other health care providers outside of the [address] [place] [address] since your last visit? Include any pap smears or colon screening. No    Medications  Does the patient need refills? NO    Learning Assessment Complete?  yes

## 2018-01-25 DIAGNOSIS — R25.2 MUSCLE CRAMPS: ICD-10-CM

## 2018-01-26 RX ORDER — CYCLOBENZAPRINE HCL 10 MG
TABLET ORAL
Qty: 30 TAB | Refills: 1 | Status: SHIPPED | OUTPATIENT
Start: 2018-01-26 | End: 2018-02-28 | Stop reason: SDUPTHER

## 2018-02-07 ENCOUNTER — OFFICE VISIT (OUTPATIENT)
Dept: FAMILY MEDICINE CLINIC | Age: 43
End: 2018-02-07

## 2018-02-07 VITALS
HEIGHT: 70 IN | WEIGHT: 278 LBS | DIASTOLIC BLOOD PRESSURE: 91 MMHG | TEMPERATURE: 98.6 F | SYSTOLIC BLOOD PRESSURE: 124 MMHG | BODY MASS INDEX: 39.8 KG/M2 | HEART RATE: 80 BPM

## 2018-02-07 DIAGNOSIS — J45.41 MODERATE PERSISTENT CHRONIC ASTHMA WITH ACUTE EXACERBATION: ICD-10-CM

## 2018-02-07 RX ORDER — PREDNISONE 10 MG/1
TABLET ORAL
Qty: 20 TAB | Refills: 0 | Status: SHIPPED | OUTPATIENT
Start: 2018-02-07 | End: 2018-03-29

## 2018-02-07 RX ORDER — ALBUTEROL SULFATE 0.83 MG/ML
2.5 SOLUTION RESPIRATORY (INHALATION)
Qty: 1 PACKAGE | Refills: 5 | Status: SHIPPED | OUTPATIENT
Start: 2018-02-07 | End: 2018-11-01 | Stop reason: SDUPTHER

## 2018-02-07 NOTE — PROGRESS NOTES
Coordination of Care  1. Have you been to the ER, urgent care clinic since your last visit? Hospitalized since your last visit? No    2. Have you seen or consulted any other health care providers outside of the [address] [place] [address] since your last visit? Include any pap smears or colon screening. No    Medications  Does the patient need refills? YES    Learning Assessment Complete?  yes

## 2018-02-07 NOTE — PATIENT INSTRUCTIONS
Asthma Attack: Care Instructions  Your Care Instructions    During an asthma attack, the airways swell and narrow. This makes it hard to breathe. Severe asthma attacks can be life-threatening, but you can help prevent them by keeping your asthma under control and treating symptoms before they get bad. Symptoms include being short of breath, having chest tightness, coughing, and wheezing. Noting and treating these symptoms can also help you avoid future trips to the emergency room. The doctor has checked you carefully, but problems can develop later. If you notice any problems or new symptoms, get medical treatment right away. Follow-up care is a key part of your treatment and safety. Be sure to make and go to all appointments, and call your doctor if you are having problems. It's also a good idea to know your test results and keep a list of the medicines you take. How can you care for yourself at home? · Follow your asthma action plan to prevent and treat attacks. If you don't have an asthma action plan, work with your doctor to create one. · Take your asthma medicines exactly as prescribed. Talk to your doctor right away if you have any questions about how to take them. ¨ Use your quick-relief medicine when you have symptoms of an attack. Quick-relief medicine is usually an albuterol inhaler. Some people need to use quick-relief medicine before they exercise. ¨ Take your controller medicine every day, not just when you have symptoms. Controller medicine is usually an inhaled corticosteroid. The goal is to prevent problems before they occur. Don't use your controller medicine to treat an attack that has already started. It doesn't work fast enough to help. ¨ If your doctor prescribed corticosteroid pills to use during an attack, take them exactly as prescribed. It may take hours for the pills to work, but they may make the episode shorter and help you breathe better.   ¨ Keep your quick-relief medicine with you at all times. · Talk to your doctor before using other medicines. Some medicines, such as aspirin, can cause asthma attacks in some people. · If you have a peak flow meter, use it to check how well you are breathing. This can help you predict when an asthma attack is going to occur. Then you can take medicine to prevent the asthma attack or make it less severe. · Do not smoke or allow others to smoke around you. Avoid smoky places. Smoking makes asthma worse. If you need help quitting, talk to your doctor about stop-smoking programs and medicines. These can increase your chances of quitting for good. · Learn what triggers an asthma attack for you, and avoid the triggers when you can. Common triggers include colds, smoke, air pollution, dust, pollen, mold, pets, cockroaches, stress, and cold air. · Avoid colds and the flu. Get a pneumococcal vaccine shot. If you have had one before, ask your doctor if you need a second dose. Get a flu vaccine every fall. If you must be around people with colds or the flu, wash your hands often. When should you call for help? Call 911 anytime you think you may need emergency care. For example, call if:  ? · You have severe trouble breathing. ?Call your doctor now or seek immediate medical care if:  ? · Your symptoms do not get better after you have followed your asthma action plan. ? · You have new or worse trouble breathing. ? · Your coughing and wheezing get worse. ? · You cough up dark brown or bloody mucus (sputum). ? · You have a new or higher fever. ? Watch closely for changes in your health, and be sure to contact your doctor if:  ? · You need to use quick-relief medicine on more than 2 days a week (unless it is just for exercise). ? · You cough more deeply or more often, especially if you notice more mucus or a change in the color of your mucus. ? · You are not getting better as expected. Where can you learn more?   Go to http://francie-ruchi.info/. Enter Z018 in the search box to learn more about \"Asthma Attack: Care Instructions. \"  Current as of: May 12, 2017  Content Version: 11.4  © 1575-8436 [employer]. Care instructions adapted under license by [employer] (which disclaims liability or warranty for this information). If you have questions about a medical condition or this instruction, always ask your healthcare professional. [address] any warranty or liability for your use of this information. Bronchitis: Care Instructions  Your Care Instructions    Bronchitis is inflammation of the bronchial tubes, which carry air to the lungs. The tubes swell and produce mucus, or phlegm. The mucus and inflamed bronchial tubes make you cough. You may have trouble breathing. Most cases of bronchitis are caused by viruses like those that cause colds. Antibiotics usually do not help and they may be harmful. Bronchitis usually develops rapidly and lasts about 2 to 3 weeks in otherwise healthy people. Follow-up care is a key part of your treatment and safety. Be sure to make and go to all appointments, and call your doctor if you are having problems. It's also a good idea to know your test results and keep a list of the medicines you take. How can you care for yourself at home? · Take all medicines exactly as prescribed. Call your doctor if you think you are having a problem with your medicine. · Get some extra rest.  · Take an over-the-counter pain medicine, such as acetaminophen (Tylenol), ibuprofen (Advil, Motrin), or naproxen (Aleve) to reduce fever and relieve body aches. Read and follow all instructions on the label. · Do not take two or more pain medicines at the same time unless the doctor told you to. Many pain medicines have acetaminophen, which is Tylenol. Too much acetaminophen (Tylenol) can be harmful.   · Take an over-the-counter cough medicine that contains dextromethorphan to help quiet a dry, hacking cough so that you can sleep. Avoid cough medicines that have more than one active ingredient. Read and follow all instructions on the label. · Breathe moist air from a humidifier, hot shower, or sink filled with hot water. The heat and moisture will thin mucus so you can cough it out. · Do not smoke. Smoking can make bronchitis worse. If you need help quitting, talk to your doctor about stop-smoking programs and medicines. These can increase your chances of quitting for good. When should you call for help? Call 911 anytime you think you may need emergency care. For example, call if:  ? · You have severe trouble breathing. ?Call your doctor now or seek immediate medical care if:  ? · You have new or worse trouble breathing. ? · You cough up dark brown or bloody mucus (sputum). ? · You have a new or higher fever. ? · You have a new rash. ? Watch closely for changes in your health, and be sure to contact your doctor if:  ? · You cough more deeply or more often, especially if you notice more mucus or a change in the color of your mucus. ? · You are not getting better as expected. Where can you learn more? Go to http://francie-ruchi.info/. Enter H333 in the search box to learn more about \"Bronchitis: Care Instructions. \"  Current as of: May 12, 2017  Content Version: 11.4  © 7767-6838 [employer]. Care instructions adapted under license by Stock[employer] (which disclaims liability or warranty for this information). If you have questions about a medical condition or this instruction, always ask your healthcare professional. [address] any warranty or liability for your use of this information.

## 2018-02-07 NOTE — MR AVS SNAPSHOT
[address] [id number] [phone] Patient: Maliha Valladares MRN: [id number] UNB:[id number] Visit Information Date & Time Provider Department Dept. Phone Encounter #  
 2/7/2018  9:30 AM Kristie Brewster, [address] [place] [address] [phone] [id number] Upcoming Health Maintenance Date Due DTaP/Tdap/Td series (1 - Tdap) 9/18/1995 Allergies as of 2/7/2018  Review Complete On: 2/7/2018 By: Norberto Middleton No Known Allergies Current Immunizations  Reviewed on 10/19/2017 Name Date Influenza Vaccine 9/20/2016 Influenza Vaccine (Quad) PF 10/19/2017, 10/6/2015  1:10 PM  
 Pneumococcal Polysaccharide (PPSV-23) 1/31/2017 Not reviewed this visit You Were Diagnosed With   
  
 Codes Comments Moderate persistent chronic asthma without complication     RMK-79-EW: J45.40 ICD-9-CM: 493.90 Vitals BP Pulse Temp Height(growth percentile) Weight(growth percentile) BMI  
 (!) 124/91 (BP 1 Location: Left arm) 80 98.6 °F (37 °C) (Oral) 5' 10\" (1.778 m) 278 lb (126.1 kg) 39.89 kg/m2 Smoking Status Never Smoker Vitals History BMI and BSA Data Body Mass Index Body Surface Area  
 39.89 kg/m 2 2.5 m 2 Preferred Pharmacy Pharmacy Name Phone [facility] [employer] [address] [place] [address] [phone] Your Updated Medication List  
  
   
This list is accurate as of: 2/7/18 10:27 AM.  Always use your most recent med list.  
  
  
  
  
 * albuterol 90 mcg/actuation inhaler Commonly known as:  VENTOLIN HFA Take 2 Puffs by inhalation every four (4) hours as needed for Wheezing. * albuterol 2.5 mg /3 mL (0.083 %) nebulizer solution Commonly known as:  PROVENTIL VENTOLIN  
3 mL by Nebulization route every four (4) hours as needed for Wheezing or Shortness of Breath. cyclobenzaprine 10 mg tablet Commonly known as:  FLEXERIL  
TAKE ONE TABLET BY MOUTH THREE TIMES DAILY AS NEEDED FOR MUSCLE SPASM  
  
 fluticasone-salmeterol 250-50 mcg/dose diskus inhaler Commonly known as:  ADVAIR DISKUS Take 1 Puff by inhalation every twelve (12) hours. * ADVIL PO Take  by mouth as needed. * ibuprofen 800 mg tablet Commonly known as:  MOTRIN Take 1 Tab by mouth every six (6) hours as needed for Pain. lisinopril-hydroCHLOROthiazide 10-12.5 mg per tablet Commonly known as:  Charlaine Kohut Take 1 Tab by mouth daily. mometasone 50 mcg/actuation nasal spray Commonly known as:  NASONEX  
2 Sprays by Both Nostrils route daily. montelukast 10 mg tablet Commonly known as:  SINGULAIR  
take 1 tablet by mouth once daily * predniSONE 10 mg tablet Commonly known as:  Merwyn Going Take 40mg daily x 3 days at the onset of asthma exacerbation. * predniSONE 10 mg tablet Commonly known as:  Merwyn Going Take 4 tabs (40mg) daily x 2 days, then take 3 tabs (30mg) x 2 days, then take 2 tabs (20mg) x 2 days, then take 1 tabs (10mg) x 2 days. * Notice: This list has 6 medication(s) that are the same as other medications prescribed for you. Read the directions carefully, and ask your doctor or other care provider to review them with you. Prescriptions Sent to Pharmacy Refills  
 albuterol (PROVENTIL VENTOLIN) 2.5 mg /3 mL (0.083 %) nebulizer solution 5 Sig: 3 mL by Nebulization route every four (4) hours as needed for Wheezing or Shortness of Breath. Class: Normal  
 Pharmacy: [facility] [address], [address] [place] [address] Ph #: [phone] Route: Nebulization  
 predniSONE (DELTASONE) 10 mg tablet 0 Sig: Take 4 tabs (40mg) daily x 2 days, then take 3 tabs (30mg) x 2 days, then take 2 tabs (20mg) x 2 days, then take 1 tabs (10mg) x 2 days.   
 Class: Normal  
 Pharmacy: [facility] [address] [facility], [address] [facility] #: [phone] To-Do List   
 02/07/2018 Imaging:  XR CHEST PA LAT Patient Instructions Asthma Attack: Care Instructions Your Care Instructions During an asthma attack, the airways swell and narrow. This makes it hard to breathe. Severe asthma attacks can be life-threatening, but you can help prevent them by keeping your asthma under control and treating symptoms before they get bad. Symptoms include being short of breath, having chest tightness, coughing, and wheezing. Noting and treating these symptoms can also help you avoid future trips to the emergency room. The doctor has checked you carefully, but problems can develop later. If you notice any problems or new symptoms, get medical treatment right away. Follow-up care is a key part of your treatment and safety. Be sure to make and go to all appointments, and call your doctor if you are having problems. It's also a good idea to know your test results and keep a list of the medicines you take. How can you care for yourself at home? · Follow your asthma action plan to prevent and treat attacks. If you don't have an asthma action plan, work with your doctor to create one. · Take your asthma medicines exactly as prescribed. Talk to your doctor right away if you have any questions about how to take them. ¨ Use your quick-relief medicine when you have symptoms of an attack. Quick-relief medicine is usually an albuterol inhaler. Some people need to use quick-relief medicine before they exercise. ¨ Take your controller medicine every day, not just when you have symptoms. Controller medicine is usually an inhaled corticosteroid. The goal is to prevent problems before they occur. Don't use your controller medicine to treat an attack that has already started. It doesn't work fast enough to help. ¨ If your doctor prescribed corticosteroid pills to use during an attack, take them exactly as prescribed. It may take hours for the pills to work, but they may make the episode shorter and help you breathe better. ¨ Keep your quick-relief medicine with you at all times. · Talk to your doctor before using other medicines. Some medicines, such as aspirin, can cause asthma attacks in some people. · If you have a peak flow meter, use it to check how well you are breathing. This can help you predict when an asthma attack is going to occur. Then you can take medicine to prevent the asthma attack or make it less severe. · Do not smoke or allow others to smoke around you. Avoid smoky places. Smoking makes asthma worse. If you need help quitting, talk to your doctor about stop-smoking programs and medicines. These can increase your chances of quitting for good. · Learn what triggers an asthma attack for you, and avoid the triggers when you can. Common triggers include colds, smoke, air pollution, dust, pollen, mold, pets, cockroaches, stress, and cold air. · Avoid colds and the flu. Get a pneumococcal vaccine shot. If you have had one before, ask your doctor if you need a second dose. Get a flu vaccine every fall. If you must be around people with colds or the flu, wash your hands often. When should you call for help? Call 911 anytime you think you may need emergency care. For example, call if: 
? · You have severe trouble breathing. ?Call your doctor now or seek immediate medical care if: 
? · Your symptoms do not get better after you have followed your asthma action plan. ? · You have new or worse trouble breathing. ? · Your coughing and wheezing get worse. ? · You cough up dark brown or bloody mucus (sputum). ? · You have a new or higher fever. ? Watch closely for changes in your health, and be sure to contact your doctor if: 
? · You need to use quick-relief medicine on more than 2 days a week (unless it is just for exercise). ? · You cough more deeply or more often, especially if you notice more mucus or a change in the color of your mucus. ? · You are not getting better as expected. Where can you learn more? Go to http://francie-ruchi.info/. Enter H697 in the search box to learn more about \"Asthma Attack: Care Instructions. \" Current as of: May 12, 2017 Content Version: 11.4 © 4279-6254 jiffstore. Care instructions adapted under license by Pack[employer] (which disclaims liability or warranty for this information). If you have questions about a medical condition or this instruction, always ask your healthcare professional. [address] any warranty or liability for your use of this information. Bronchitis: Care Instructions Your Care Instructions Bronchitis is inflammation of the bronchial tubes, which carry air to the lungs. The tubes swell and produce mucus, or phlegm. The mucus and inflamed bronchial tubes make you cough. You may have trouble breathing. Most cases of bronchitis are caused by viruses like those that cause colds. Antibiotics usually do not help and they may be harmful. Bronchitis usually develops rapidly and lasts about 2 to 3 weeks in otherwise healthy people. Follow-up care is a key part of your treatment and safety. Be sure to make and go to all appointments, and call your doctor if you are having problems. It's also a good idea to know your test results and keep a list of the medicines you take. How can you care for yourself at home? · Take all medicines exactly as prescribed. Call your doctor if you think you are having a problem with your medicine. · Get some extra rest. 
· Take an over-the-counter pain medicine, such as acetaminophen (Tylenol), ibuprofen (Advil, Motrin), or naproxen (Aleve) to reduce fever and relieve body aches. Read and follow all instructions on the label. · Do not take two or more pain medicines at the same time unless the doctor told you to. Many pain medicines have acetaminophen, which is Tylenol. Too much acetaminophen (Tylenol) can be harmful. · Take an over-the-counter cough medicine that contains dextromethorphan to help quiet a dry, hacking cough so that you can sleep. Avoid cough medicines that have more than one active ingredient. Read and follow all instructions on the label. · Breathe moist air from a humidifier, hot shower, or sink filled with hot water. The heat and moisture will thin mucus so you can cough it out. · Do not smoke. Smoking can make bronchitis worse. If you need help quitting, talk to your doctor about stop-smoking programs and medicines. These can increase your chances of quitting for good. When should you call for help? Call 911 anytime you think you may need emergency care. For example, call if: 
? · You have severe trouble breathing. ?Call your doctor now or seek immediate medical care if: 
? · You have new or worse trouble breathing. ? · You cough up dark brown or bloody mucus (sputum). ? · You have a new or higher fever. ? · You have a new rash. ? Watch closely for changes in your health, and be sure to contact your doctor if: 
? · You cough more deeply or more often, especially if you notice more mucus or a change in the color of your mucus. ? · You are not getting better as expected. Where can you learn more? Go to http://francie-ruchi.info/. Enter H333 in the search box to learn more about \"Bronchitis: Care Instructions. \" Current as of: May 12, 2017 Content Version: 11.4 © 0894-7382 Alorum. Care instructions adapted under license by [employer]Time (which disclaims liability or warranty for this information).  If you have questions about a medical condition or this instruction, always ask your healthcare professional. [employer] disclaims any warranty or liability for your use of this information. Introducing [facility]! Ju Bermeo introduces [employer] patient portal. Now you can access parts of your medical record, email your doctor's office, and request medication refills online. 1. In your internet browser, go to [email or web address] Viva la Vita/Galaxy Digital 2. Click on the First Time User? Click Here link in the Sign In box. You will see the New Member Sign Up page. 3. Enter your I[employer] Security Access Code exactly as it appears below. You will not need to use this code after youve completed the sign-up process. If you do not sign up before the expiration date, you must request a new code. · [employer] Access Code: [email or web address] Expires: 5/8/2018 10:25 AM 
 
4. Enter the last four digits of your Social Security Number (xxxx) and Date of Birth (mm/dd/yyyy) as indicated and click Submit. You will be taken to the next sign-up page. 5. Create a Ionic Security ID. This will be your I[employer] Security login ID and cannot be changed, so think of one that is secure and easy to remember. 6. Create a [employer] password. You can change your password at any time. 7. Enter your Password Reset Question and Answer. This can be used at a later time if you forget your password. 8. Enter your e-mail address. You will receive e-mail notification when new information is available in [address] 9. Click Sign Up. You can now view and download portions of your medical record. 10. Click the Download Summary menu link to download a portable copy of your medical information. If you have questions, please visit the Frequently Asked Questions section of the I[employer] website. Remember, [employer] is NOT to be used for urgent needs. For medical emergencies, dial 911. Now available from your iPhone and Android! Please provide this summary of care documentation to your next provider. Your primary care clinician is listed as Jose Roman. If you have any questions after today's visit, please call [phone].

## 2018-02-07 NOTE — PROGRESS NOTES
Susie Lizarraga is a 36 y.o. male    Issues discussed today include:    Chief Complaint   Patient presents with    Follow-up     generalized pain       1) Cough, wheezing:   Was given prednisone prn prescription for situations like this where he needed relief prior to getting doctor's appt. Has taken prednisone 40mg daily x 3 days, which has helped a lot. Also using breathing treatments, 4-6 per day in the last few weeks. Usually only once daily when not sick. Just got advair and albuterol at [facility]. Asks for refills on albuterol neb vials. Feels like he is getting better. No fever, chills, fatigue. Had flu shot. Several family members with similar sxs. Says he used to go to the ER several times per year before he met Dr. Alicia Shelton. Has not been in hospital since 10/2015. Appreciated prn steroid order from [facility] after last visit. 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 1 Package 5    predniSONE (DELTASONE) 10 mg tablet Take 4 tabs (40mg) daily x 2 days, then take 3 tabs (30mg) x 2 days, then take 2 tabs (20mg) x 2 days, then take 1 tabs (10mg) x 2 days. 20 Tab 0    cyclobenzaprine (FLEXERIL) 10 mg tablet TAKE ONE TABLET BY MOUTH THREE TIMES DAILY AS NEEDED FOR MUSCLE SPASM 30 Tab 1    lisinopril-hydroCHLOROthiazide (PRINZIDE, ZESTORETIC) 10-12.5 mg per tablet Take 1 Tab by mouth daily.  90 Tab 3    montelukast (SINGULAIR) 10 mg tablet take 1 tablet by mouth once daily 90 Tab 3    fluticasone-salmeterol (ADVAIR DISKUS) 250-50 mcg/dose diskus inhaler Take 1 Puff by inhalation every twelve (12) hours. 3 Inhaler 3    albuterol (VENTOLIN HFA) 90 mcg/actuation inhaler Take 2 Puffs by inhalation every four (4) hours as needed for Wheezing. 2 Inhaler 6    predniSONE (DELTASONE) 10 mg tablet Take 40mg daily x 3 days at the onset of asthma exacerbation. 12 Tab 3    ibuprofen (MOTRIN) 800 mg tablet Take 1 Tab by mouth every six (6) hours as needed for Pain. 15 Tab 0    IBUPROFEN (ADVIL PO) Take  by mouth as needed.  mometasone (NASONEX) 50 mcg/actuation nasal spray 2 Sprays by Both Nostrils route daily. 2 Container 5       Allergies:  No Known Allergies    LMP:  No LMP for male patient. Social History     Social History    Marital status:      Spouse name: N/A    Number of children: N/A    Years of education: N/A     Occupational History    Not on file. Social History Main Topics    Smoking status: Never Smoker    Smokeless tobacco: Never Used    Alcohol use 0.0 oz/week     0 Standard drinks or equivalent per week      Comment: occ    Drug use: Yes     Special: Opiates, Marijuana    Sexual activity: Not on file     Other Topics Concern    Not on file     Social History Narrative    2015: worked previously as a  , currently unemployed while in a methadone treatment program, he is  and lives with his wife.          Family History   Problem Relation Age of Onset    COPD Mother     Colon Cancer Father       at 76         Physical Exam   Visit Vitals    BP (!) 124/91 (BP 1 Location: Left arm)    Pulse 80    Temp 98.6 °F (37 °C) (Oral)    Ht 5' 10\" (1.778 m)    Wt 278 lb (126.1 kg)    BMI 39.89 kg/m2      BP Readings from Last 3 Encounters:   18 (!) 124/91   17 149/85   10/19/17 119/88     Constitutional: Appears well,  No acute distress, Vitals noted  Psychiatric:  Affect normal, Alert and Oriented to person/place/time  Eyes:  Conjunctiva clear, no drainage  ENT:  External ears and nose normal, Mucous membranes moist  Neck:  General inspection normal. Supple. Heart:  Normal HR, Normal S1 and S2,  Regular rhythm. No murmurs, rubs or gallops. Lungs:  Clear to auscultation, good respiratory effort, good air movement, + end expiratory wheezes throughout, no rales or rhonchi  Extremities: Without edema, good peripheral pulses  Skin:  Warm to palpation, without rashes      Assessment/Plan:      ICD-10-CM ICD-9-CM    1. Moderate persistent chronic asthma with acute exacerbation J45.41 493.92 albuterol (PROVENTIL VENTOLIN) 2.5 mg /3 mL (0.083 %) nebulizer solution      predniSONE (DELTASONE) 10 mg tablet      XR CHEST PA LAT       Likely viral URI triggering asthma exacerbation, moving good air today  Recommend resuming advair and continue prn albuterol - refilled vials  Extending prednisone for taper  Ordered CXR to r/o pna, but no fever, chills, sputum so more likely just obstructive lung dz      Follow-up Disposition:  Return if symptoms worsen or fail to improve.       Abbey Schofield MD  [address] [place] [address]

## 2018-02-28 DIAGNOSIS — R25.2 MUSCLE CRAMPS: ICD-10-CM

## 2018-02-28 RX ORDER — CYCLOBENZAPRINE HCL 10 MG
TABLET ORAL
Qty: 30 TAB | Refills: 1 | Status: SHIPPED | OUTPATIENT
Start: 2018-02-28 | End: 2018-03-29 | Stop reason: SDUPTHER

## 2018-03-01 DIAGNOSIS — J45.40 ASTHMA, CHRONIC, MODERATE PERSISTENT, UNCOMPLICATED: ICD-10-CM

## 2018-03-01 RX ORDER — PREDNISONE 10 MG/1
TABLET ORAL
Qty: 12 TAB | Refills: 3 | Status: SHIPPED | COMMUNITY
Start: 2018-03-01 | End: 2018-03-29

## 2018-03-29 ENCOUNTER — OFFICE VISIT (OUTPATIENT)
Dept: FAMILY MEDICINE CLINIC | Age: 43
End: 2018-03-29

## 2018-03-29 VITALS
OXYGEN SATURATION: 97 % | BODY MASS INDEX: 36.22 KG/M2 | HEART RATE: 98 BPM | HEIGHT: 70 IN | DIASTOLIC BLOOD PRESSURE: 63 MMHG | SYSTOLIC BLOOD PRESSURE: 109 MMHG | WEIGHT: 253 LBS | TEMPERATURE: 98.3 F

## 2018-03-29 DIAGNOSIS — J45.40 MODERATE PERSISTENT CHRONIC ASTHMA WITHOUT COMPLICATION: Primary | ICD-10-CM

## 2018-03-29 DIAGNOSIS — R25.2 MUSCLE CRAMPS: ICD-10-CM

## 2018-03-29 PROBLEM — E66.01 SEVERE OBESITY (BMI 35.0-39.9) WITH COMORBIDITY (HCC): Status: ACTIVE | Noted: 2018-03-29

## 2018-03-29 RX ORDER — ALBUTEROL SULFATE 0.83 MG/ML
2.5 SOLUTION RESPIRATORY (INHALATION) ONCE
Qty: 1 EACH | Refills: 0
Start: 2018-03-29 | End: 2018-03-29

## 2018-03-29 RX ORDER — CYCLOBENZAPRINE HCL 10 MG
10 TABLET ORAL DAILY
Qty: 30 TAB | Refills: 1 | Status: SHIPPED | OUTPATIENT
Start: 2018-03-29 | End: 2018-05-16 | Stop reason: SDUPTHER

## 2018-03-29 RX ORDER — CYCLOBENZAPRINE HCL 10 MG
TABLET ORAL
Qty: 30 TAB | Refills: 1 | Status: CANCELLED | OUTPATIENT
Start: 2018-03-29

## 2018-03-29 RX ORDER — PREDNISONE 10 MG/1
TABLET ORAL
Qty: 63 TAB | Refills: 0 | Status: SHIPPED | OUTPATIENT
Start: 2018-03-29 | End: 2018-05-17

## 2018-03-29 NOTE — PROGRESS NOTES
Assessment/Plan:       ICD-10-CM ICD-9-CM    1. Moderate persistent chronic asthma without complication K23.60 047.47 ALBUTEROL, INHAL. SOL., FDA-APPROVED FINAL, NON-COMPOUND UNIT DOSE, 1 MG      INHAL RX, AIRWAY OBST/DX SPUTUM INDUCT     Follow-up Disposition: Not on [address], [address] [place] [address]  Phone: [phone] Fax: [phone] [address] [facility], [facility] [address]  Phone: [phone] Fax: [phone]    [facility], [address] [place] [address]  Phone: [phone] Fax: [phone]  Subjective:     Chief Complaint   Patient presents with    Ear Pain     right, x 1 wk    Chest Congestion     x 2-3 wks    Zachery Haines is a 36 y.o. WHITE OR  male. HPI: R ear pain. He is using his nasonex and uses advair twice a day. He has been coughing up mucus. He has never been a cigarette smoker. He  has a past medical history of Asthma; HTN (hypertension); and Opiate abuse, episodic. Review of Systems: Negative for: fever, leg swelling, exertional dyspnea, palpitations. Current Medications:   Current Outpatient Prescriptions on File Prior to Visit   Medication Sig    cyclobenzaprine (FLEXERIL) 10 mg tablet TAKE ONE TABLET BY MOUTH THREE TIMES DAILY AS NEEDED FOR MUSCLE SPASM    albuterol (PROVENTIL VENTOLIN) 2.5 mg /3 mL (0.083 %) nebulizer solution 3 mL by Nebulization route every four (4) hours as needed for Wheezing or Shortness of Breath.  lisinopril-hydroCHLOROthiazide (PRINZIDE, ZESTORETIC) 10-12.5 mg per tablet Take 1 Tab by mouth daily.  montelukast (SINGULAIR) 10 mg tablet take 1 tablet by mouth once daily    fluticasone-salmeterol (ADVAIR DISKUS) 250-50 mcg/dose diskus inhaler Take 1 Puff by inhalation every twelve (12) hours.     albuterol (VENTOLIN HFA) 90 mcg/actuation inhaler Take 2 Puffs by inhalation every four (4) hours as needed for Wheezing.  ibuprofen (MOTRIN) 800 mg tablet Take 1 Tab by mouth every six (6) hours as needed for Pain.  mometasone (NASONEX) 50 mcg/actuation nasal spray 2 Sprays by Both Nostrils route daily.  IBUPROFEN (ADVIL PO) Take  by mouth as needed. No current facility-administered medications on file prior to visit. Social History: He  reports that he has never smoked. He has never used smokeless tobacco. He reports that he drinks alcohol. He reports that he uses illicit drugs, including Opiates and Marijuana. Objective:     Vitals:    03/29/18 1418   BP: 109/63   Pulse: 98   Temp: 98.3 °F (36.8 °C)   TempSrc: Oral   SpO2: 97%   Weight: 253 lb (114.8 kg)   Height: 5' 10\" (1.778 m)    No LMP for male patient. Wt Readings from Last 2 Encounters:   03/29/18 253 lb (114.8 kg)   02/07/18 278 lb (126.1 kg)     No results found for any visits on 03/29/18. Physical Examination: R TM bulging; L TM clear. General appearance - appears well. Chest - inspiratory and expiratory wheezing throughout. Heart - regular rate and rhythm without murmurs, rubs, or gallops. Abdomen - bowel sounds present x 4, NT, ND. Extremities - pulses intact. No peripheral edema. Assessment/Plan:   Diagnoses and all orders for this visit:    1. Moderate persistent chronic asthma without complication  -     ALBUTEROL, INHAL. S.()  -     INHAL RX, AIRWAY OBST/DX SPUTUM INDUCT ([id number])    Other orders  -     albuterol (PROVENTIL VENTOLIN) 2.5 mg /3 mL (0.083 %) nebulizer solution; 3 mL by Nebulization route once for 1 dose. -     predniSONE (DELTASONE) 10 mg tablet; 6 po qd x 3d; 5 po qd x 3d; 4 po qd x 3d; 3 po qd x 3d; 2 po qd x 3d; 1 po qd x 3d    end expir wheezing; overall, pt has improved. Follow-up Disposition: Not on File   Juan Hankins, BRADY, FNP-BC, BC-ADM  Rosi Alfredo expressed understanding of this plan.

## 2018-03-29 NOTE — PROGRESS NOTES
Chief Complaint   Patient presents with    Ear Pain     right, x 1 wk    Chest Congestion     x 2-3 wks     Coordination of Care  1. Have you been to the ER, urgent care clinic since your last visit? Hospitalized since your last visit? No    2. Have you seen or consulted any other health care providers outside of the [address] [place] [address] since your last visit? Include any pap smears or colon screening. No    Does the patient need refills? YES    Learning Assessment Complete?  yes

## 2018-04-04 ENCOUNTER — TELEPHONE (OUTPATIENT)
Dept: FAMILY MEDICINE CLINIC | Age: 43
End: 2018-04-04

## 2018-04-04 NOTE — TELEPHONE ENCOUNTER
Message was left on VM yesterday evening asking for Rx for antibiotics for a chest infection. Called the patient this morning, had to leave a VM asking him to call the clinic.

## 2018-05-02 DIAGNOSIS — R25.2 MUSCLE CRAMPS: ICD-10-CM

## 2018-05-03 RX ORDER — CYCLOBENZAPRINE HCL 10 MG
TABLET ORAL
Qty: 30 TAB | Refills: 1 | OUTPATIENT
Start: 2018-05-03

## 2018-05-16 ENCOUNTER — HOSPITAL ENCOUNTER (OUTPATIENT)
Dept: LAB | Age: 43
Discharge: HOME | End: 2018-05-16

## 2018-05-16 ENCOUNTER — OFFICE VISIT (OUTPATIENT)
Dept: FAMILY MEDICINE CLINIC | Age: 43
End: 2018-05-16

## 2018-05-16 VITALS
DIASTOLIC BLOOD PRESSURE: 68 MMHG | HEART RATE: 72 BPM | WEIGHT: 251 LBS | SYSTOLIC BLOOD PRESSURE: 96 MMHG | TEMPERATURE: 98.6 F | BODY MASS INDEX: 36.01 KG/M2

## 2018-05-16 DIAGNOSIS — Z13.1 SCREENING FOR DIABETES MELLITUS: ICD-10-CM

## 2018-05-16 DIAGNOSIS — I10 ESSENTIAL HYPERTENSION: ICD-10-CM

## 2018-05-16 DIAGNOSIS — R25.2 MUSCLE CRAMPS: Primary | ICD-10-CM

## 2018-05-16 LAB
ALBUMIN SERPL-MCNC: 3.8 G/DL (ref 3.5–5)
ALBUMIN/GLOB SERPL: 1.7 {RATIO} (ref 1.1–2.2)
ALP SERPL-CCNC: 83 U/L (ref 45–117)
ALT SERPL-CCNC: 20 U/L (ref 12–78)
ANION GAP SERPL CALC-SCNC: 9 MMOL/L (ref 5–15)
AST SERPL-CCNC: 19 U/L (ref 15–37)
BILIRUB SERPL-MCNC: 0.9 MG/DL (ref 0.2–1)
BUN SERPL-MCNC: 14 MG/DL (ref 6–20)
BUN/CREAT SERPL: 8 (ref 12–20)
CALCIUM SERPL-MCNC: 8.9 MG/DL (ref 8.5–10.1)
CHLORIDE SERPL-SCNC: 104 MMOL/L (ref 97–108)
CO2 SERPL-SCNC: 30 MMOL/L (ref 21–32)
CREAT SERPL-MCNC: 1.67 MG/DL (ref 0.7–1.3)
CREAT UR-MCNC: 352 MG/DL
EST. AVERAGE GLUCOSE BLD GHB EST-MCNC: 117 MG/DL
GLOBULIN SER CALC-MCNC: 2.2 G/DL (ref 2–4)
GLUCOSE POC: NORMAL MG/DL
GLUCOSE SERPL-MCNC: 86 MG/DL (ref 65–100)
HBA1C MFR BLD: 5.7 % (ref 4.2–6.3)
MICROALBUMIN UR-MCNC: 1.43 MG/DL
MICROALBUMIN/CREAT UR-RTO: 4 MG/G (ref 0–30)
POTASSIUM SERPL-SCNC: 5.1 MMOL/L (ref 3.5–5.1)
PROT SERPL-MCNC: 6 G/DL (ref 6.4–8.2)
SODIUM SERPL-SCNC: 143 MMOL/L (ref 136–145)

## 2018-05-16 PROCEDURE — 82043 UR ALBUMIN QUANTITATIVE: CPT | Performed by: NURSE PRACTITIONER

## 2018-05-16 PROCEDURE — 80053 COMPREHEN METABOLIC PANEL: CPT | Performed by: NURSE PRACTITIONER

## 2018-05-16 PROCEDURE — 83036 HEMOGLOBIN GLYCOSYLATED A1C: CPT | Performed by: NURSE PRACTITIONER

## 2018-05-16 RX ORDER — LISINOPRIL 5 MG/1
5 TABLET ORAL DAILY
Qty: 30 TAB | Refills: 1 | Status: SHIPPED | OUTPATIENT
Start: 2018-05-16 | End: 2018-05-17 | Stop reason: ALTCHOICE

## 2018-05-16 RX ORDER — CYCLOBENZAPRINE HCL 10 MG
10 TABLET ORAL
Qty: 30 TAB | Refills: 1 | Status: SHIPPED | OUTPATIENT
Start: 2018-05-16 | End: 2018-07-05

## 2018-05-16 NOTE — PROGRESS NOTES
Assessment/Plan:       ICD-10-CM ICD-9-CM    1. Muscle cramps R25.2 729.82 cyclobenzaprine (FLEXERIL) 10 mg tablet   2. Essential hypertension R66 344.3 METABOLIC PANEL, COMPREHENSIVE      MICROALBUMIN, UR, RAND W/ MICROALB/CREAT RATIO      DISCONTINUED: lisinopril (PRINIVIL, ZESTRIL) 5 mg tablet   3. Screening for diabetes mellitus Z13.1 V77.1 AMB POC GLUCOSE BLOOD, BY GLUCOSE MONITORING DEVICE      HEMOGLOBIN A1C WITH EAG     Follow-up Disposition:  Return in about 2 months (around 7/16/2018) for DOUBLE SLOT LK or sooner as needed. [facility]  [address]  [address] [facility] [address]  Phone: [phone] Fax: [phone] [address] [place] [address], [address] [place] [address]  Phone: [phone] Fax: [phone]    [place] [address]  Phone: [phone] Fax: [phone]      [facility]  Subjective:     Chief Complaint   Patient presents with    Muscle Pain     follow up    [facility]    Radha Ambrocio is a 36 y.o. WHITE OR  male. Knots in neck and back. HPI:  Here with Ricky. Had marijuana yesterday and a few days before that. He  has a past medical history of Asthma; HTN (hypertension); and Opiate abuse, episodic. Review of Systems: Negative for: fever, chest pain, shortness of breath, leg swelling, exertional dyspnea, palpitations. Current Medications:   Current Outpatient Prescriptions on File Prior to Visit   Medication Sig    albuterol (PROVENTIL VENTOLIN) 2.5 mg /3 mL (0.083 %) nebulizer solution 3 mL by Nebulization route every four (4) hours as needed for Wheezing or Shortness of Breath.     montelukast (SINGULAIR) 10 mg tablet take 1 tablet by mouth once daily    fluticasone-salmeterol (ADVAIR DISKUS) 250-50 mcg/dose diskus inhaler Take 1 Puff by inhalation every twelve (12) hours.  albuterol (VENTOLIN HFA) 90 mcg/actuation inhaler Take 2 Puffs by inhalation every four (4) hours as needed for Wheezing.  mometasone (NASONEX) 50 mcg/actuation nasal spray 2 Sprays by Both Nostrils route daily. No current facility-administered medications on file prior to visit. Social History: He  reports that he has never smoked. He has never used smokeless tobacco. He reports that he drinks alcohol. He reports that he uses illicit drugs, including Opiates and Marijuana. Objective:     Vitals:    05/16/18 1315   BP: 96/68   Pulse: 72   Temp: 98.6 °F (37 °C)   TempSrc: Oral   Weight: 251 lb (113.9 kg)     Wt Readings from Last 2 Encounters:   05/16/18 251 lb (113.9 kg)   03/29/18 253 lb (114.8 kg)   247-252 up to 280 at home  Wt Readings from Last 3 Encounters:   05/16/18 251 lb (113.9 kg)   03/29/18 253 lb (114.8 kg)   02/07/18 278 lb (126.1 kg)   Check kidney function. Pulse 100, lungs with wheezing. Results for orders placed or performed during the hospital encounter of 52/18/01   METABOLIC PANEL, COMPREHENSIVE   Result Value Ref Range    Sodium 143 136 - 145 mmol/L    Potassium 5.1 3.5 - 5.1 mmol/L    Chloride 104 97 - 108 mmol/L    CO2 30 21 - 32 mmol/L    Anion gap 9 5 - 15 mmol/L    Glucose 86 65 - 100 mg/dL    BUN 14 6 - 20 MG/DL    Creatinine 1.67 (H) 0.70 - 1.30 MG/DL    BUN/Creatinine ratio 8 (L) 12 - 20      GFR est AA 55 (L) >60 ml/min/1.73m2    GFR est non-AA 45 (L) >60 ml/min/1.73m2    Calcium 8.9 8.5 - 10.1 MG/DL    Bilirubin, total 0.9 0.2 - 1.0 MG/DL    ALT (SGPT) 20 12 - 78 U/L    AST (SGOT) 19 15 - 37 U/L    Alk.  phosphatase 83 45 - 117 U/L    Protein, total 6.0 (L) 6.4 - 8.2 g/dL    Albumin 3.8 3.5 - 5.0 g/dL    Globulin 2.2 2.0 - 4.0 g/dL    A-G Ratio 1.7 1.1 - 2.2     MICROALBUMIN, UR, RAND W/ MICROALB/CREAT RATIO   Result Value Ref Range    Microalbumin,urine random 1.43 MG/DL    Creatinine, urine 352.00 mg/dL    Microalbumin/Creat ratio (mg/g creat) 4 0 - 30 mg/g   HEMOGLOBIN A1C WITH EAG   Result Value Ref Range    Hemoglobin A1c 5.7 4.2 - 6.3 %    Est. average glucose 117 mg/dL   Results for orders placed or performed in visit on 05/16/18   AMB POC GLUCOSE BLOOD, BY GLUCOSE MONITORING DEVICE   Result Value Ref Range    Glucose POC 84 non fasting mg/dL      Physical Examination:  General appearance - well developed, no acute distress. Chest - clear to auscultation. Heart - regular rate and rhythm without murmurs, rubs, or gallops. Abdomen - bowel sounds present x 4, NT, ND. Extremities - pulses intact. No peripheral edema. Assessment/Plan:   Diagnoses and all orders for this visit:    1. Muscle cramps  -     cyclobenzaprine (FLEXERIL) 10 mg tablet; Take 1 Tab by mouth three (3) times daily as needed for Muscle Spasm(s). As needed for back pain. 2. Essential hypertension  -     METABOLIC PANEL, COMPREHENSIVE; Future  -     MICROALBUMIN, UR, RAND W/ MICROALB/CREAT RATIO; Future    3. Screening for diabetes mellitus  -     AMB POC GLUCOSE BLOOD, BY GLUCOSE MONITORING DEVICE  -     HEMOGLOBIN A1C WITH EAG; Future      Follow-up Disposition:  Return in about 2 months (around 7/16/2018) for DOUBLE SLOT LK or sooner as needed. Mark Kaur, BRADY, FNP-BC, BC-ADM  Raghu Camarillo expressed understanding of this plan.

## 2018-05-16 NOTE — PROGRESS NOTES
Coordination of Care  1. Have you been to the ER, urgent care clinic since your last visit? Hospitalized since your last visit? No    2. Have you seen or consulted any other health care providers outside of the [facility] since your last visit? Include any pap smears or colon screening. No    Does the patient need refills?  NO    Learning Assessment Complete? yes    Results for orders placed or performed in visit on 05/16/18   AMB POC GLUCOSE BLOOD, BY GLUCOSE MONITORING DEVICE   Result Value Ref Range    Glucose POC 84 non fasting mg/dL

## 2018-05-17 ENCOUNTER — TELEPHONE (OUTPATIENT)
Dept: FAMILY MEDICINE CLINIC | Age: 43
End: 2018-05-17

## 2018-05-17 DIAGNOSIS — I10 ESSENTIAL HYPERTENSION: Primary | ICD-10-CM

## 2018-05-17 RX ORDER — LISINOPRIL 2.5 MG/1
2.5 TABLET ORAL DAILY
Qty: 30 TAB | Refills: 1 | Status: SHIPPED | OUTPATIENT
Start: 2018-05-17 | End: 2018-07-17 | Stop reason: SDUPTHER

## 2018-05-17 RX ORDER — HYDROCHLOROTHIAZIDE 25 MG/1
25 TABLET ORAL DAILY
Qty: 30 TAB | Refills: 1 | Status: SHIPPED | OUTPATIENT
Start: 2018-05-17 | End: 2018-07-17 | Stop reason: SDUPTHER

## 2018-05-17 NOTE — PROGRESS NOTES
Kidney function worse. STOP Lisinopril 5mg. New prescription for HCTZ 25mg, 1 po qam (will help with swelling specifically). Dr. Florencia Olvera, what suggestions do you have?

## 2018-05-17 NOTE — PROGRESS NOTES
Will add 2.5mg lisinopril daily and recheck bmp in 1 month. See telephone call. Diagnoses and all orders for this visit:    1. Essential hypertension  -     lisinopril (PRINIVIL, ZESTRIL) 2.5 mg tablet; Take 1 Tab by mouth daily.  -     METABOLIC PANEL, BASIC; Future      Current Outpatient Prescriptions   Medication Sig Dispense Refill    lisinopril (PRINIVIL, ZESTRIL) 2.5 mg tablet Take 1 Tab by mouth daily. 30 Tab 1    hydroCHLOROthiazide (HYDRODIURIL) 25 mg tablet Take 1 Tab by mouth daily. For blood pressure, swelling.  30 Tab 1

## 2018-05-17 NOTE — PROGRESS NOTES
Renal function is not much worse. I recommend you start the lis at 2.5 mg, make sure he has enough fluids daily. It is ok to see gfr decrease by around 20% with acei. I would recheck it in a month.

## 2018-05-17 NOTE — PROGRESS NOTES
Patient given lab result message and will stop lisinopril and start the hctz 25mg however, patient expressed deep concern over the changes and his next f/u is not until July. Routing back to provider.

## 2018-06-08 ENCOUNTER — CLINICAL SUPPORT (OUTPATIENT)
Dept: FAMILY MEDICINE CLINIC | Age: 43
End: 2018-06-08

## 2018-06-08 ENCOUNTER — HOSPITAL ENCOUNTER (OUTPATIENT)
Dept: LAB | Age: 43
Discharge: HOME | End: 2018-06-08

## 2018-06-08 DIAGNOSIS — I10 ESSENTIAL HYPERTENSION: ICD-10-CM

## 2018-06-08 LAB
ANION GAP SERPL CALC-SCNC: 6 MMOL/L (ref 5–15)
BUN SERPL-MCNC: 14 MG/DL (ref 6–20)
BUN/CREAT SERPL: 9 (ref 12–20)
CALCIUM SERPL-MCNC: 8.6 MG/DL (ref 8.5–10.1)
CHLORIDE SERPL-SCNC: 102 MMOL/L (ref 97–108)
CO2 SERPL-SCNC: 32 MMOL/L (ref 21–32)
CREAT SERPL-MCNC: 1.59 MG/DL (ref 0.7–1.3)
GLUCOSE SERPL-MCNC: 96 MG/DL (ref 65–100)
POTASSIUM SERPL-SCNC: 3.8 MMOL/L (ref 3.5–5.1)
SODIUM SERPL-SCNC: 140 MMOL/L (ref 136–145)

## 2018-06-08 PROCEDURE — 80048 BASIC METABOLIC PNL TOTAL CA: CPT | Performed by: NURSE PRACTITIONER

## 2018-06-08 NOTE — PROGRESS NOTES
Statements below are document by Jose Deleon MA patient here for fasting labs only, labs drawn was an CMP in the [place], patient left lab with no bleeding, no pain, and feeling well. Patient was advise that a Dr or Nurse will call with the results if abnormal and if normal no phone call will be done. Also the patient was advised she/he can discuss the results at next visit with the Dr. Dasia Emery, Medical Assistant.

## 2018-07-05 ENCOUNTER — HOSPITAL ENCOUNTER (OUTPATIENT)
Dept: LAB | Age: 43
Discharge: HOME | End: 2018-07-05

## 2018-07-05 ENCOUNTER — OFFICE VISIT (OUTPATIENT)
Dept: FAMILY MEDICINE CLINIC | Age: 43
End: 2018-07-05

## 2018-07-05 VITALS
TEMPERATURE: 98.2 F | HEIGHT: 70 IN | WEIGHT: 247 LBS | DIASTOLIC BLOOD PRESSURE: 71 MMHG | HEART RATE: 74 BPM | SYSTOLIC BLOOD PRESSURE: 102 MMHG | BODY MASS INDEX: 35.36 KG/M2

## 2018-07-05 DIAGNOSIS — R79.89 ELEVATED SERUM CREATININE: Primary | ICD-10-CM

## 2018-07-05 DIAGNOSIS — R79.89 ELEVATED SERUM CREATININE: ICD-10-CM

## 2018-07-05 DIAGNOSIS — M25.562 LEFT MEDIAL KNEE PAIN: ICD-10-CM

## 2018-07-05 DIAGNOSIS — I10 ESSENTIAL HYPERTENSION: ICD-10-CM

## 2018-07-05 LAB
ALBUMIN SERPL-MCNC: 3.4 G/DL (ref 3.5–5)
ALBUMIN/GLOB SERPL: 1.3 {RATIO} (ref 1.1–2.2)
ALP SERPL-CCNC: 73 U/L (ref 45–117)
ALT SERPL-CCNC: 17 U/L (ref 12–78)
ANION GAP SERPL CALC-SCNC: 8 MMOL/L (ref 5–15)
AST SERPL-CCNC: 17 U/L (ref 15–37)
BILIRUB SERPL-MCNC: 0.9 MG/DL (ref 0.2–1)
BUN SERPL-MCNC: 10 MG/DL (ref 6–20)
BUN/CREAT SERPL: 7 (ref 12–20)
CALCIUM SERPL-MCNC: 8.6 MG/DL (ref 8.5–10.1)
CHLORIDE SERPL-SCNC: 101 MMOL/L (ref 97–108)
CO2 SERPL-SCNC: 31 MMOL/L (ref 21–32)
CREAT SERPL-MCNC: 1.51 MG/DL (ref 0.7–1.3)
GLOBULIN SER CALC-MCNC: 2.7 G/DL (ref 2–4)
GLUCOSE SERPL-MCNC: 90 MG/DL (ref 65–100)
POTASSIUM SERPL-SCNC: 3.6 MMOL/L (ref 3.5–5.1)
PROT SERPL-MCNC: 6.1 G/DL (ref 6.4–8.2)
SODIUM SERPL-SCNC: 140 MMOL/L (ref 136–145)

## 2018-07-05 PROCEDURE — 80053 COMPREHEN METABOLIC PANEL: CPT | Performed by: NURSE PRACTITIONER

## 2018-07-05 RX ORDER — TRAMADOL HYDROCHLORIDE 50 MG/1
50 TABLET ORAL
Qty: 60 TAB | Refills: 0 | Status: SHIPPED | OUTPATIENT
Start: 2018-07-05 | End: 2018-07-05 | Stop reason: SDDI

## 2018-07-05 NOTE — PROGRESS NOTES
Assessment/Plan:       ICD-10-CM ICD-9-CM    1. Elevated serum creatinine H95.71 250.40 METABOLIC PANEL, COMPREHENSIVE   2. Essential hypertension I10 401.9 CANCELED: METABOLIC PANEL, BASIC   3. Left medial knee pain M25.562 719.46 DISCONTINUED: traMADol (ULTRAM) 50 mg tablet     Follow-up Disposition:  Return in about 4 weeks (around 8/2/2018). [facility]  [address]  [address] [facility] [address]  Phone: [phone] Fax: [phone] [address] [facility], [facility] [place] [address]  [address] [address]  Phone: [phone] Fax: [phone]    [place] [address]  Phone: [phone] Fax: [phone]  HE NEEDS MORE MEDICATIONS - I MAY WANT TO CHANGE MEDS. [facility]  Subjective:     Chief Complaint   Patient presents with    Medication Evaluation     f/u on med change    Odette Hathaway is a 36 y.o. WHITE OR  male. HPI:   He  has a past medical history of Asthma; HTN (hypertension); and Opiate abuse, episodic. Review of Systems: Negative for: fever, chest pain, shortness of breath, leg swelling, exertional dyspnea, palpitations. Current Medications:   Current Outpatient Prescriptions on File Prior to Visit   Medication Sig    hydroCHLOROthiazide (HYDRODIURIL) 25 mg tablet Take 1 Tab by mouth daily. For blood pressure, swelling.  lisinopril (PRINIVIL, ZESTRIL) 2.5 mg tablet Take 1 Tab by mouth daily.  albuterol (PROVENTIL VENTOLIN) 2.5 mg /3 mL (0.083 %) nebulizer solution 3 mL by Nebulization route every four (4) hours as needed for Wheezing or Shortness of Breath.  montelukast (SINGULAIR) 10 mg tablet take 1 tablet by mouth once daily    fluticasone-salmeterol (ADVAIR DISKUS) 250-50 mcg/dose diskus inhaler Take 1 Puff by inhalation every twelve (12) hours.     albuterol (VENTOLIN HFA) 90 mcg/actuation inhaler Take 2 Puffs by inhalation every four (4) hours as needed for Wheezing.  mometasone (NASONEX) 50 mcg/actuation nasal spray 2 Sprays by Both Nostrils route daily. No current facility-administered medications on file prior to visit. Wine cooler last night. Social History: He  reports that he has never smoked. He has never used smokeless tobacco. He reports that he drinks alcohol. He reports that he uses illicit drugs, including Opiates and Marijuana. Objective:     Vitals:    07/05/18 1023   BP: 102/71   Pulse: 74   Temp: 98.2 °F (36.8 °C)   TempSrc: Oral   Weight: 247 lb (112 kg)   Height: 5' 10\" (1.778 m)    No LMP for male patient. Wt Readings from Last 2 Encounters:   07/05/18 247 lb (112 kg)   05/16/18 251 lb (113.9 kg)   Urinating 7 times a day, DOWN FROM 15.  (can I increase lisinopril and decrease hctz?)  No results found for any visits on 07/05/18. Physical Examination: Knee pain increases with full knee extension. No patellar crepitus. No edema. No instability of the knee. Mild tenderness medial aspect of the knee. General appearance - well developed, no acute distress. Chest - clear to auscultation. Heart - regular rate and rhythm without murmurs, rubs, or gallops. Abdomen - bowel sounds present x 4, NT, ND. Extremities - pulses intact. No peripheral edema. Assessment/Plan:   Diagnoses and all orders for this visit:    1. Elevated serum creatinine  -     METABOLIC PANEL, COMPREHENSIVE; Future    2. Essential hypertension    3. Left medial knee pain      Follow-up Disposition:  Return in about 4 weeks (around 8/2/2018). I discussed with the patient that tramadol can be tried for pain. As a controlled substance, we would need to complete a controlled substances contract today. He agreed to do this following lab draw.   When I returned to his room to present the controlled substances contract and the prescription for the tramadol, he was no longer in the room.  After searching the clinic I learned that he had left the clinic. I have voided the tramadol prescription. Plan will be to refill blood pressure medications based on labs and recommend follow up accordingly. He may take tylenol for pain and may also try non-pharmacologic measures such as ice, heat. Salvador Encinas, DNP, FNP-BC, BC-ADM  Tete Barber expressed understanding of this plan.

## 2018-07-05 NOTE — PROGRESS NOTES
Coordination of Care  1. Have you been to the ER, urgent care clinic since your last visit? Hospitalized since your last visit? No    2. Have you seen or consulted any other health care providers outside of the [address] [place] [address] since your last visit? Include any pap smears or colon screening. No    Does the patient need refills? YES    Learning Assessment Complete?  yes  Depression Screening complete in the past 12 months? yes

## 2018-07-17 DIAGNOSIS — I10 ESSENTIAL HYPERTENSION: ICD-10-CM

## 2018-07-17 DIAGNOSIS — R25.2 MUSCLE CRAMPS: ICD-10-CM

## 2018-07-17 RX ORDER — HYDROCHLOROTHIAZIDE 25 MG/1
TABLET ORAL
Qty: 30 TAB | Refills: 1 | Status: SHIPPED | OUTPATIENT
Start: 2018-07-17 | End: 2018-09-17 | Stop reason: SDUPTHER

## 2018-07-17 RX ORDER — LISINOPRIL 2.5 MG/1
TABLET ORAL
Qty: 30 TAB | Refills: 1 | Status: SHIPPED | OUTPATIENT
Start: 2018-07-17 | End: 2018-08-30 | Stop reason: SDUPTHER

## 2018-07-17 RX ORDER — CYCLOBENZAPRINE HCL 10 MG
TABLET ORAL
Qty: 30 TAB | Refills: 1 | Status: SHIPPED | OUTPATIENT
Start: 2018-07-17 | End: 2018-08-08 | Stop reason: SDUPTHER

## 2018-08-08 DIAGNOSIS — R25.2 MUSCLE CRAMPS: ICD-10-CM

## 2018-08-09 RX ORDER — CYCLOBENZAPRINE HCL 10 MG
TABLET ORAL
Qty: 30 TAB | Refills: 1 | Status: SHIPPED | OUTPATIENT
Start: 2018-08-09 | End: 2018-09-05 | Stop reason: SDUPTHER

## 2018-08-30 ENCOUNTER — OFFICE VISIT (OUTPATIENT)
Dept: FAMILY MEDICINE CLINIC | Age: 43
End: 2018-08-30

## 2018-08-30 VITALS
SYSTOLIC BLOOD PRESSURE: 123 MMHG | HEART RATE: 87 BPM | BODY MASS INDEX: 35.15 KG/M2 | OXYGEN SATURATION: 95 % | TEMPERATURE: 98.7 F | DIASTOLIC BLOOD PRESSURE: 70 MMHG | WEIGHT: 245 LBS

## 2018-08-30 DIAGNOSIS — J45.40 ASTHMA, CHRONIC, MODERATE PERSISTENT, UNCOMPLICATED: Primary | ICD-10-CM

## 2018-08-30 DIAGNOSIS — I10 ESSENTIAL HYPERTENSION: ICD-10-CM

## 2018-08-30 RX ORDER — MONTELUKAST SODIUM 10 MG/1
TABLET ORAL
Qty: 90 TAB | Refills: 3 | Status: SHIPPED | OUTPATIENT
Start: 2018-08-30

## 2018-08-30 RX ORDER — LISINOPRIL 5 MG/1
5 TABLET ORAL DAILY
Qty: 90 TAB | Refills: 1 | Status: SHIPPED | OUTPATIENT
Start: 2018-08-30

## 2018-08-30 RX ORDER — PREDNISONE 10 MG/1
TABLET ORAL
Qty: 12 TAB | Refills: 3 | Status: SHIPPED | OUTPATIENT
Start: 2018-08-30 | End: 2018-09-07 | Stop reason: SDUPTHER

## 2018-08-30 RX ORDER — MOMETASONE FUROATE 50 UG/1
2 SPRAY, METERED NASAL DAILY
Qty: 3 CONTAINER | Refills: 3 | Status: SHIPPED | OUTPATIENT
Start: 2018-08-30

## 2018-08-30 NOTE — PROGRESS NOTES
Coordination of Care  1. Have you been to the ER, urgent care clinic since your last visit? Hospitalized since your last visit? No    2. Have you seen or consulted any other health care providers outside of the [address] [place] [address] since your last visit? Include any pap smears or colon screening. No    Does the patient need refills? YES    Learning Assessment Complete?  yes

## 2018-08-30 NOTE — PROGRESS NOTES
Bhavik Pa is a 39 y.o. male    Issues discussed today include:    Chief Complaint   Patient presents with    Follow-up     Chest congestion, difficulty breathing     Medication Refill     Medication refill        1) Asthma:  Known h/o asthma. Has felt like he's \"coming down with something. \" C/o cough and chest congestion - started 4 d ago, progressively worsening. Out of nasonex, requesting refill on this. Has albuterol and using this prn. Says he has had steroids in the past when he gets like this and helps his breathing a lot. He worries about not being able to get an appt soon enough when he has asthma flare and asks that he has some refills on prednisone tabs to tide him over until he can get in with future attacks. Does not smoke tobacco. He denies illicit drug use. Denies fever, chills, malaise, severe SOB, chest pain. Data reviewed or ordered today:       Other problems include:  Patient Active Problem List   Diagnosis Code    Essential hypertension I10    Chronic bronchitis ([facility] [address] J42    Moderate persistent chronic asthma without complication S27.27    Seasonal allergic rhinitis J30.2    Severe obesity (BMI 35.0-39. 9) with comorbidity ([facility]) E66.01       Medications:  Current Outpatient Prescriptions   Medication Sig Dispense Refill    lisinopril (PRINIVIL, ZESTRIL) 5 mg tablet Take 1 Tab by mouth daily. 90 Tab 1    montelukast (SINGULAIR) 10 mg tablet take 1 tablet by mouth once daily 90 Tab 3    mometasone (NASONEX) 50 mcg/actuation nasal spray 2 Sprays by Both Nostrils route daily. 3 Container 3    hydroCHLOROthiazide (HYDRODIURIL) 25 mg tablet TAKE 1 TABLET BY MOUTH ONCE DAILY FOR BLOOD PRESSURE/SWELLING 90 Tab 1    predniSONE (DELTASONE) 10 mg tablet Take 3 tabs PO daily x 2 days. Then take 2 tabs PO daily x 2 days. Then take 1 tab PO daily x 2 days. Then stop. Pls apply goodrx.  12 Tab 0    cyclobenzaprine (FLEXERIL) 10 mg tablet TAKE ONE TABLET BY MOUTH THREE TIMES DAILY AS NEEDED FOR MUSCLE SPASM 30 Tab 1    albuterol (PROVENTIL VENTOLIN) 2.5 mg /3 mL (0.083 %) nebulizer solution 3 mL by Nebulization route every four (4) hours as needed for Wheezing or Shortness of Breath. 1 Package 5    fluticasone-salmeterol (ADVAIR DISKUS) 250-50 mcg/dose diskus inhaler Take 1 Puff by inhalation every twelve (12) hours. 3 Inhaler 3    albuterol (VENTOLIN HFA) 90 mcg/actuation inhaler Take 2 Puffs by inhalation every four (4) hours as needed for Wheezing. 2 Inhaler 6       Allergies: Allergies   Allergen Reactions    Ibuprofen Other (comments)     Increased serum creatinine       LMP:  No LMP for male patient. Social History     Social History    Marital status:      Spouse name: N/A    Number of children: N/A    Years of education: N/A     Occupational History    Not on file. Social History Main Topics    Smoking status: Never Smoker    Smokeless tobacco: Never Used    Alcohol use 0.0 oz/week     0 Standard drinks or equivalent per week      Comment: occ    Drug use: Yes     Special: Opiates, Marijuana    Sexual activity: Not on file     Other Topics Concern    Not on file     Social History Narrative    2015: worked previously as a  , currently unemployed while in a methadone treatment program, he is  and lives with his wife.          Family History   Problem Relation Age of Onset    COPD Mother     Colon Cancer Father       at 76         Physical Exam   Visit Vitals    /70 (BP 1 Location: Left arm, BP Patient Position: Sitting)    Pulse 87    Temp 98.7 °F (37.1 °C) (Oral)    Wt 245 lb (111.1 kg)    SpO2 95%    BMI 35.15 kg/m2      BP Readings from Last 3 Encounters:   18 123/70   18 102/71   18 96/68     Constitutional: Appears well,  No acute distress, Vitals noted  Psychiatric:  Affect normal, Alert and Oriented to person/place/time  Eyes:  Conjunctiva clear, no drainage  ENT:  External ears and nose normal, Mucous membranes moist. TMs grey and non-bulging bilaterally. OP without erythema, edema or exudate. Bilateral nares without active drainage, edema or polyps. Neck:  General inspection normal. Supple. Heart:  Normal HR, Normal S1 and S2,  Regular rhythm. No murmurs, rubs or gallops. Lungs:  Clear to auscultation, good respiratory effort, good air movement, faint expiratory wheezes in upper lobes, no rales or rhonchi  Extremities: Without edema, good peripheral pulses  Skin:  Warm to palpation, without rashes      Assessment/Plan:      ICD-10-CM ICD-9-CM    1. Asthma, chronic, moderate persistent, uncomplicated M34.99 129.03 montelukast (SINGULAIR) 10 mg tablet      mometasone (NASONEX) 50 mcg/actuation nasal spray      DISCONTINUED: predniSONE (DELTASONE) 10 mg tablet   2. Essential hypertension I10 401.9 lisinopril (PRINIVIL, ZESTRIL) 5 mg tablet       Asthma exacerbation without acute resp distress, VSS. Rel frequent attacks, despite advair 250-50mg 1 puff bid, ?triggered by post nasal drainage (upper airway cough syndrome)   - Resume singulair and nasonex  - Prednisone burst and rx for 10mg tabs to be taken prn 40mg every day x 3 days until he gets appt with next flare  - Consider increasing advair dose   - Call if worsening resp or develop of systemic sxs   - Rec flu vaccine when available    Follow-up Disposition:  Return in about 3 months (around 11/30/2018).         Nel Yost MD  [address] [place] [address]

## 2018-08-30 NOTE — MR AVS SNAPSHOT
[address] [place] [address] 
[phone] Patient: Nely Quiñones MRN: [id number] PYH:[id number][birth year]23[birth year]49 Visit Information Date & Time Provider Department Dept. Phone Encounter #  
 8/30/2018  9:30 AM Luis Eduardo Rogers, [address] [facility] [phone] [id number] Follow-up Instructions Return in about 3 months (around 11/30/2018). Upcoming Health Maintenance Date Due DTaP/Tdap/Td series (1 - Tdap) 9/18/1995 Influenza Age 5 to Adult 8/1/2018 Allergies as of 8/30/2018  Review Complete On: 8/30/2018 By: Luis Eduardo Rogers MD  
  
 Severity Noted Reaction Type Reactions Ibuprofen  05/17/2018    Other (comments) Increased serum creatinine Current Immunizations  Reviewed on 10/19/2017 Name Date Influenza Vaccine 9/20/2016 Influenza Vaccine (Quad) PF 10/19/2017, 10/6/2015  1:10 PM  
 Pneumococcal Polysaccharide (PPSV-23) 1/31/2017 Not reviewed this visit You Were Diagnosed With   
  
 Codes Comments Essential hypertension     ICD-10-CM: I10 
ICD-9-CM: 401.9 Asthma, chronic, moderate persistent, uncomplicated     VMB-30-HF: J45.40 ICD-9-CM: 493.90 Vitals BP Pulse Temp Weight(growth percentile) SpO2 BMI  
 123/70 (BP 1 Location: Left arm, BP Patient Position: Sitting) 87 98.7 °F (37.1 °C) (Oral) 245 lb (111.1 kg) 95% 35.15 kg/m2 Smoking Status Never Smoker Vitals History BMI and BSA Data Body Mass Index Body Surface Area  
 35.15 kg/m 2 2.34 m 2 Preferred Pharmacy Pharmacy Name Phone [address] [phone] Your Updated Medication List  
  
   
This list is accurate as of 8/30/18 10:15 AM.  Always use your most recent med list.  
  
  
  
  
 * albuterol 90 mcg/actuation inhaler Commonly known as:  VENTOLIN HFA  
 Take 2 Puffs by inhalation every four (4) hours as needed for Wheezing. * albuterol 2.5 mg /3 mL (0.083 %) nebulizer solution Commonly known as:  PROVENTIL VENTOLIN  
3 mL by Nebulization route every four (4) hours as needed for Wheezing or Shortness of Breath. cyclobenzaprine 10 mg tablet Commonly known as:  FLEXERIL  
TAKE 1 TABLET BY MOUTH THREE TIMES DAILY AS NEEDED FOR MUSCLE SPASM  
  
 fluticasone-salmeterol 250-50 mcg/dose diskus inhaler Commonly known as:  ADVAIR DISKUS Take 1 Puff by inhalation every twelve (12) hours. hydroCHLOROthiazide 25 mg tablet Commonly known as:  HYDRODIURIL  
TAKE 1 TABLET BY MOUTH ONCE DAILY FOR BLOOD PRESSURE/SWELLING  
  
 lisinopril 5 mg tablet Commonly known as:  Debby Sat Take 1 Tab by mouth daily. mometasone 50 mcg/actuation nasal spray Commonly known as:  NASONEX  
2 Sprays by Both Nostrils route daily. montelukast 10 mg tablet Commonly known as:  SINGULAIR  
take 1 tablet by mouth once daily  
  
 predniSONE 10 mg tablet Commonly known as:  Lonne Eduin Take 40mg daily x 3 days at the onset of asthma exacerbation. * Notice: This list has 2 medication(s) that are the same as other medications prescribed for you. Read the directions carefully, and ask your doctor or other care provider to review them with you. Prescriptions Printed Refills  
 montelukast (SINGULAIR) 10 mg tablet 3 Sig: take 1 tablet by mouth once daily Class: Print Prescriptions Sent to Pharmacy Refills  
 lisinopril (PRINIVIL, ZESTRIL) 5 mg tablet 1 Sig: Take 1 Tab by mouth daily. Class: Normal  
 Pharmacy: [facility] [address], [address] [place] [address] Ph #: [phone] Route: Oral  
 predniSONE (DELTASONE) 10 mg tablet 3 Sig: Take 40mg daily x 3 days at the onset of asthma exacerbation.   
 Class: Normal  
 Pharmacy: [facility] [address] [facility], [address] [facility] Ph #: [phone]  
 mometasone (NASONEX) 50 mcg/actuation nasal spray 3 Si Sprays by Both Nostrils route daily. Class: Normal  
 Pharmacy: [address] [place] [address] Ph #: [phone] Route: Both Nostrils Follow-up Instructions Return in about 3 months (around 2018). Introducing [facility] SERVICES! Janet Moreno introduces [employer] patient portal. Now you can access parts of your medical record, email your doctor's office, and request medication refills online. 1. In your internet browser, go to [email or web address]. [email or web address] 2. Click on the First Time User? Click Here link in the Sign In box. You will see the New Member Sign Up page. 3. Enter your [employer] Access Code exactly as it appears below. You will not need to use this code after youve completed the sign-up process. If you do not sign up before the expiration date, you must request a new code. · [employer] Access Code: [email or web address] Expires: 10/14/2018 10:32 AM 
 
4. Enter the last four digits of your Social Security Number (xxxx) and Date of Birth (mm/dd/yyyy) as indicated and click Submit. You will be taken to the next sign-up page. 5. Create a [employer] ID. This will be your [employer] login ID and cannot be changed, so think of one that is secure and easy to remember. 6. Create a [employer] password. You can change your password at any time. 7. Enter your Password Reset Question and Answer. This can be used at a later time if you forget your password. 8. Enter your e-mail address. You will receive e-mail notification when new information is available in [address] 9. Click Sign Up. You can now view and download portions of your medical record. 10. Click the Download Summary menu link to download a portable copy of your medical information. If you have questions, please visit the Frequently Asked Questions section of the [employer] website. Remember, [employer] is NOT to be used for urgent needs. For medical emergencies, dial 911. Now available from your iPhone and Android! Please provide this summary of care documentation to your next provider. Your primary care clinician is listed as Severo Colder. If you have any questions after today's visit, please call [phone].

## 2018-09-04 ENCOUNTER — TELEPHONE (OUTPATIENT)
Dept: FAMILY MEDICINE CLINIC | Age: 43
End: 2018-09-04

## 2018-09-04 NOTE — TELEPHONE ENCOUNTER
Message left today from person asking for refill for pt's prednisone. I returned phone call and LM for pt to call this office. I am routing message to provider for review. Devon Cox RN

## 2018-09-05 DIAGNOSIS — R25.2 MUSCLE CRAMPS: ICD-10-CM

## 2018-09-05 RX ORDER — CYCLOBENZAPRINE HCL 10 MG
TABLET ORAL
Qty: 30 TAB | Refills: 1 | Status: SHIPPED | OUTPATIENT
Start: 2018-09-05 | End: 2018-10-02 | Stop reason: SDUPTHER

## 2018-09-05 NOTE — TELEPHONE ENCOUNTER
Call made to pharmacy, rx is there for the patient    Call made to pt, he has taking the prescribed prednisone 40 mg x 3 tabs and is aware of the refills available. .  But he is still having the cough. He wanted to know should he refill the prednisone again for the same dosing. He says in the past he was given a tapered dose and is concerned about taking to much.

## 2018-09-07 DIAGNOSIS — R05.8 PRODUCTIVE COUGH: Primary | ICD-10-CM

## 2018-09-07 RX ORDER — DOXYCYCLINE 100 MG/1
100 CAPSULE ORAL 2 TIMES DAILY
Qty: 14 CAP | Refills: 0 | Status: SHIPPED | OUTPATIENT
Start: 2018-09-07 | End: 2018-09-14

## 2018-09-07 RX ORDER — PREDNISONE 10 MG/1
TABLET ORAL
Qty: 12 TAB | Refills: 0 | Status: SHIPPED | OUTPATIENT
Start: 2018-09-07 | End: 2019-12-06 | Stop reason: CLARIF

## 2018-09-07 NOTE — TELEPHONE ENCOUNTER
I called pt back and discussed sxs and med situation  Still with cough, worsening per pt and bringing up green, bad tasting mucus. No fever or chills, but + chest congestion nd sxs going on now > 1 week.    Will send prednisone taper and doxycycline 100mg bid x 7 days, goodrx info attached to both prescriptions (should be $5 and $19 respectively), pt states he doesn't have any money for a CXR (ordered) and will prob have to go to pharmacy tomorrow after getting some money

## 2018-09-17 DIAGNOSIS — I10 ESSENTIAL HYPERTENSION: ICD-10-CM

## 2018-09-20 RX ORDER — HYDROCHLOROTHIAZIDE 25 MG/1
TABLET ORAL
Qty: 90 TAB | Refills: 1 | Status: SHIPPED | OUTPATIENT
Start: 2018-09-20 | End: 2019-03-11 | Stop reason: SDUPTHER

## 2018-09-20 NOTE — TELEPHONE ENCOUNTER
Patient left a message on the [employer]il this morning asking about the status of his requested refill for HCTZ. Per chart review, a Loterity message was sent to Denise Herman on 09-17-18. Routing the request to Dr. Donna North who saw the patient last on 08-30-18.  Louie Kurtz RN

## 2018-09-27 DIAGNOSIS — R25.2 MUSCLE CRAMPS: ICD-10-CM

## 2018-09-27 RX ORDER — CYCLOBENZAPRINE HCL 10 MG
TABLET ORAL
Qty: 30 TAB | Refills: 1 | OUTPATIENT
Start: 2018-09-27

## 2018-09-27 NOTE — TELEPHONE ENCOUNTER
Thanks, Dipesh Xavier. Last rx sent to pharmacy by NP Zaina Molina on 9/5/18 as refill without appt re: muscle cramps  Last appt where this prob was addressed was 5/16/18. Rec pt make appt before further refills.    Routing to CBN

## 2018-10-02 ENCOUNTER — TELEPHONE (OUTPATIENT)
Dept: FAMILY MEDICINE CLINIC | Age: 43
End: 2018-10-02

## 2018-10-02 DIAGNOSIS — R25.2 MUSCLE CRAMPS: ICD-10-CM

## 2018-10-02 NOTE — TELEPHONE ENCOUNTER
Telephone call received from the patient's wife, Richard Landon, who is listed on the [address] form. She states she is calling to request a refill of Flexeril for him. Per chart review, Dr. Lavonne Portillo would like the patient to return for a follow-up appointment before approving any refills. This was explained to Mrs. Gui Cai who agreed to be transferred to the [facility] registrar to schedule an appointment for the patient.  Kristina Lowe RN

## 2018-10-04 RX ORDER — CYCLOBENZAPRINE HCL 10 MG
TABLET ORAL
Qty: 30 TAB | Refills: 1 | Status: SHIPPED | OUTPATIENT
Start: 2018-10-04 | End: 2018-11-01 | Stop reason: SDUPTHER

## 2018-10-04 NOTE — TELEPHONE ENCOUNTER
Covering for my colleague and reviewed patient's chart. He has been on flexeril for many months. Will refill but he should be seen for ongoing use of muscle relaxant.  Forwarding this to doctor who last saw him and nursing staff so they can let him know that he will need to be seen for further refills

## 2018-11-01 ENCOUNTER — OFFICE VISIT (OUTPATIENT)
Dept: FAMILY MEDICINE CLINIC | Age: 43
End: 2018-11-01

## 2018-11-01 VITALS
BODY MASS INDEX: 35.73 KG/M2 | DIASTOLIC BLOOD PRESSURE: 67 MMHG | TEMPERATURE: 98 F | WEIGHT: 249 LBS | SYSTOLIC BLOOD PRESSURE: 114 MMHG | HEART RATE: 78 BPM

## 2018-11-01 DIAGNOSIS — J45.40 MODERATE PERSISTENT CHRONIC ASTHMA WITHOUT COMPLICATION: Primary | ICD-10-CM

## 2018-11-01 DIAGNOSIS — Z23 ENCOUNTER FOR IMMUNIZATION: ICD-10-CM

## 2018-11-01 DIAGNOSIS — R73.03 PREDIABETES: ICD-10-CM

## 2018-11-01 DIAGNOSIS — R25.2 MUSCLE CRAMPS: ICD-10-CM

## 2018-11-01 DIAGNOSIS — I10 ESSENTIAL HYPERTENSION: ICD-10-CM

## 2018-11-01 RX ORDER — ALBUTEROL SULFATE 0.83 MG/ML
2.5 SOLUTION RESPIRATORY (INHALATION)
Qty: 1 PACKAGE | Refills: 5 | Status: SHIPPED | OUTPATIENT
Start: 2018-11-01

## 2018-11-01 RX ORDER — CYCLOBENZAPRINE HCL 10 MG
TABLET ORAL
Qty: 30 TAB | Refills: 1 | Status: SHIPPED | OUTPATIENT
Start: 2018-11-01 | End: 2018-12-20

## 2018-11-01 NOTE — PROGRESS NOTES
Gave vaccine per protocol. See scanned vaccination consent form. Documented in [address]. Gave patient VIS information sheet and reviewed instructions regarding possible adverse side effects and allergic reactions. No adverse reaction noted at time of discharge.  Troy Warren RN

## 2018-11-01 NOTE — PROGRESS NOTES
[address], [address] [place] [address]  Office  [phone]      Subjective:   Edward Mg is a 39 y.o. male   CC: Medication refill  History provided by patient     HPI:  Mr. Salvatore Evans is a 42yoM with PHMx significant for asthma, HTN, muscle spasms, prediabetes and hx of opiate abuse who is here for medication refills. Financial screening yesterday here and he was told that this clinic was closing. Will have Medicaid starting January 1st, 2019. Asthma:  -Sx: wheezing at night everyday and he has breathing treatments. Mucus: chronic issue. Cough: productive but not all the time and he can clear his mucus. -Advair: currently using everyday.   -Albuterol spray and nebulizer-[facility]. Needs refill on the nebulizer.  -Nasonex-2 sprays per nostril per day, everyday. Need refill  -Singulair- 90 day script refilled. HTN  -Lisinopril 5mg daily. --110s/60-70s. Weekly grocery store BP check.   -Cr 1.51 on July 2018. Prediabetes  -A1c 5.7 on 5/16/2018  -Exercise everyday with walking (~1hr per day) with dog and weights.  -Diet: low salt with no fried foods. Muscle spasms  -Upper back and neck  -Flexeril 10mg for years  -Does do stretches at home    Immunization  -Desires flu shot today      Tobacco: Never smoker  EtOH: no hx, does not drink  Drugs: no. Marijuana edibles, a couple of times a month. Support system: yes    Past Medical History:   Diagnosis Date    Asthma     HTN (hypertension)     Opiate abuse, episodic (HCC)      Allergies   Allergen Reactions    Ibuprofen Other (comments)     Increased serum creatinine     Current Outpatient Medications on File Prior to Visit   Medication Sig Dispense Refill    hydroCHLOROthiazide (HYDRODIURIL) 25 mg tablet TAKE 1 TABLET BY MOUTH ONCE DAILY FOR BLOOD PRESSURE/SWELLING 90 Tab 1    predniSONE (DELTASONE) 10 mg tablet Take 3 tabs PO daily x 2 days. Then take 2 tabs PO daily x 2 days. Then take 1 tab PO daily x 2 days.  Then stop. Pls apply goodrx. 12 Tab 0    lisinopril (PRINIVIL, ZESTRIL) 5 mg tablet Take 1 Tab by mouth daily. 90 Tab 1    montelukast (SINGULAIR) 10 mg tablet take 1 tablet by mouth once daily 90 Tab 3    mometasone (NASONEX) 50 mcg/actuation nasal spray 2 Sprays by Both Nostrils route daily. 3 Container 3    fluticasone-salmeterol (ADVAIR DISKUS) 250-50 mcg/dose diskus inhaler Take 1 Puff by inhalation every twelve (12) hours. 3 Inhaler 3    albuterol (VENTOLIN HFA) 90 mcg/actuation inhaler Take 2 Puffs by inhalation every four (4) hours as needed for Wheezing. 2 Inhaler 6     No current facility-administered medications on file prior to visit. Family History   Problem Relation Age of Onset    COPD Mother     Colon Cancer Father          at 76     Social History     Socioeconomic History    Marital status:      Spouse name: Not on file    Number of children: Not on file    Years of education: Not on file    Highest education level: Not on file   Social Needs    Financial resource strain: Not on file    Food insecurity - worry: Not on file    Food insecurity - inability: Not on file   [employer] needs - medical: Not on file   [employer] needs - non-medical: Not on file   Occupational History    Not on file   Tobacco Use    Smoking status: Never Smoker    Smokeless tobacco: Never Used   Substance and Sexual Activity    Alcohol use: Yes     Alcohol/week: 0.0 oz     Comment: occ    Drug use: Yes     Types: Opiates, Marijuana    Sexual activity: Not on file   Other Topics Concern    Not on file   Social History Narrative    2015: worked previously as a  , currently unemployed while in a methadone treatment program, he is  and lives with his wife. No past surgical history on file.     Patient Active Problem List   Diagnosis Code    Essential hypertension I10    Chronic bronchitis ([facility] J42    Moderate persistent chronic asthma without complication J45.40    Seasonal allergic rhinitis J30.2    Severe obesity (BMI 35.0-39. 9) with comorbidity ([facility] E66.01           Review of Systems   Constitutional: Negative for chills and fever. Eyes: Negative for pain. Respiratory: Negative for shortness of breath. Cardiovascular: Negative for chest pain and palpitations. Gastrointestinal: Negative for abdominal pain, blood in stool, nausea and vomiting. Genitourinary: Negative for hematuria. Skin: Negative for rash. Objective:     Visit Vitals  /67 (BP 1 Location: Left arm, BP Patient Position: Sitting)   Pulse 78   Temp 98 °F (36.7 °C) (Oral)   Wt 249 lb (112.9 kg)   BMI 35.73 kg/m²        Physical Exam   Constitutional: He is oriented to person, place, and time. He appears well-developed and well-nourished. No distress. HENT:   Head: Normocephalic and atraumatic. Eyes: Right eye exhibits no discharge. Left eye exhibits no discharge. No scleral icterus. Neck: Normal range of motion. Cardiovascular: Normal rate and regular rhythm. Pulmonary/Chest: Effort normal. No respiratory distress. He has wheezes (mild bibasilar. ). He exhibits no tenderness. Abdominal: Soft. Bowel sounds are normal. There is no tenderness. Neurological: He is alert and oriented to person, place, and time. Skin: Skin is warm and dry. He is not diaphoretic. Psychiatric: He has a normal mood and affect. Pertinent Labs/Studies: None      Assessment and orders:     Deanna Silva is a 39 y.o. WHITE OR  male presents for medication refills and influenza immunization. 1. Moderate persistent chronic asthma  -Continue Advair, Nasonex, Singulair  - albuterol (PROVENTIL VENTOLIN) 2.5 mg /3 mL (0.083 %) nebulizer solution; 3 mL by Nebulization route every four (4) hours as needed for Wheezing or Shortness of Breath. Dispense: 1 Package; Refill: 5    2. Essential hypertension  -Stable. BP at goal. Continue Lisinopril 5mg    3.  Prediabetes  -A1c 5.7 on 5/16/2018  -Continue to work on healthy diet and moderate exercise  -Working on weight loss    4. Muscle cramps  -chronic issue. Stable. Advised warm compresses, tylenol, and stretching exercises. - cyclobenzaprine (FLEXERIL) 10 mg tablet; TAKE 1 TABLET BY MOUTH THREE TIMES DAILY AS NEEDED FOR MUSCLE SPASM  Dispense: 30 Tab; Refill: 1    5. Encounter for immunization  - INFLUENZA VIRUS VAC QUAD,SPLIT,PRESV FREE SYRINGE IM      Follow up: 6 months. I have reviewed patient medical and social history and medications. I have reviewed pertinent labs results and other data. I have discussed the diagnosis with the patient and the intended plan as seen in the above orders. The patient has received an after-visit summary and questions were answered concerning future plans. I have discussed medication side effects and warnings with the patient as well.     Patient discussed and seen with Dr. José Rios, Attending Physician    El Jaramillo MD  [address] [facility] Resident  11/01/18

## 2018-11-01 NOTE — PATIENT INSTRUCTIONS
Neck Spasm: Exercises  Your Care Instructions  Here are some examples of typical rehabilitation exercises for your condition. Start each exercise slowly. Ease off the exercise if you start to have pain. Your doctor or physical therapist will tell you when you can start these exercises and which ones will work best for you. How to do the exercises  Levator scapula stretch    1. Sit in a firm chair, or stand up straight. 2. Gently tilt your head toward your left shoulder. 3. Turn your head to look down into your armpit, bending your head slightly forward. Let the weight of your head stretch your neck muscles. 4. Hold for 15 to 30 seconds. 5. Return to your starting position. 6. Follow the same instructions above, but tilt your head toward your right shoulder. 7. Repeat 2 to 4 times toward each shoulder. Upper trapezius stretch    1. Sit in a firm chair, or stand up straight. 2. This stretch works best if you keep your shoulder down as you lean away from it. To help you remember to do this, start by relaxing your shoulders and lightly holding on to your thighs or your chair. 3. Tilt your head toward your shoulder and hold for 15 to 30 seconds. Let the weight of your head stretch your muscles. 4. If you would like a little added stretch, place your arm behind your back. Use the arm opposite of the direction you are tilting your head. For example, if you are tilting your head to the left, place your right arm behind your back. 5. Repeat 2 to 4 times toward each shoulder. Neck rotation    1. Sit in a firm chair, or stand up straight. 2. Keeping your chin level, turn your head to the right, and hold for 15 to 30 seconds. 3. Turn your head to the left, and hold for 15 to 30 seconds. 4. Repeat 2 to 4 times to each side. Chin tuck    1. Lie on the floor with a rolled-up towel under your neck. Your head should be touching the floor. 2. Slowly bring your chin toward the front of your neck.   3. Hold for a count of 6, and then relax for up to 10 seconds. 4. Repeat 8 to 12 times. Forward neck flexion    1. Sit in a firm chair, or stand up straight. 2. Bend your head forward. 3. Hold for 15 to 30 seconds, then return to your starting position. 4. Repeat 2 to 4 times. Follow-up care is a key part of your treatment and safety. Be sure to make and go to all appointments, and call your doctor if you are having problems. It's also a good idea to know your test results and keep a list of the medicines you take. Where can you learn more? Go to http://francie-ruchi.info/. Enter P962 in the search box to learn more about \"Neck Spasm: Exercises. \"  Current as of: November 29, 2017  Content Version: 11.8  © 3637-3715 [employer] Care instructions adapted under license by [employer] (which disclaims liability or warranty for this information). If you have questions about a medical condition or this instruction, always ask your healthcare professional. [place] [address] any warranty or liability for your use of this information. Healthy Upper Back: Exercises  Your Care Instructions  Here are some examples of exercises for your upper back. Start each exercise slowly. Ease off the exercise if you start to have pain. Your doctor or physical therapist will tell you when you can start these exercises and which ones will work best for you. How to do the exercises  Lower neck and upper back stretch    8. Stretch your arms out in front of your body. Clasp one hand on top of your other hand. 9. Gently reach out so that you feel your shoulder blades stretching away from each other. 10. Gently bend your head forward. 11. Hold for 15 to 30 seconds. 12. Repeat 2 to 4 times. Midback stretch    6. Kneel on the floor, and sit back on your ankles. 7. Lean forward, place your hands on the floor, and stretch your arms out in front of you.  Rest your head between your arms. 8. Gently push your chest toward the floor, reaching as far in front of you as possible. 9. Hold for 15 to 30 seconds. 10. Repeat 2 to 4 times. Shoulder rolls    5. Sit comfortably with your feet shoulder-width apart. You can also do this exercise while standing. 6. Roll your shoulders up, then back, and then down in a smooth, circular motion. 7. Repeat 2 to 4 times. Wall push-up    5. Stand against a wall with your feet about 12 to 24 inches back from the wall. If you feel any pain when you do this exercise, stand closer to the wall. 6. Place your hands on the wall slightly wider apart than your shoulders, and lean forward. 7. Gently lean your body toward the wall. Then push back to your starting position. Keep the motion smooth and controlled. 8. Repeat 8 to 12 times. Resisted shoulder blade squeeze    5. Sit or stand, holding the band in both hands in front of you. Keep your elbows close to your sides, bent at a 90-degree angle. Your palms should face up. 6. Squeeze your shoulder blades together, and move your arms to the outside, stretching the band. Be sure to keep your elbows at your sides while you do this. 7. Relax. 8. Repeat 8 to 12 times. Resisted rows    1. Put the band around a solid object, such as a bedpost, at about waist level. Hold one end of the band in each hand. 2. With your elbows at your sides and bent to 90 degrees, pull the band back to move your shoulder blades toward each other. Return to the starting position. 3. Repeat 8 to 12 times. Follow-up care is a key part of your treatment and safety. Be sure to make and go to all appointments, and call your doctor if you are having problems. It's also a good idea to know your test results and keep a list of the medicines you take. Where can you learn more? Go to http://francie-ruchi.info/. Enter F096 in the search box to learn more about \"Healthy Upper Back: Exercises. \"  Current as of: November 29, 2017  Content Version: 11.8  © 0956-1345 [employer]. Care instructions adapted under license by [employer] (which disclaims liability or warranty for this information). If you have questions about a medical condition or this instruction, always ask your healthcare professional. [address] any warranty or liability for your use of this information.

## 2018-11-01 NOTE — PROGRESS NOTES
Coordination of Care  1. Have you been to the ER, urgent care clinic since your last visit? Hospitalized since your last visit? No    2. Have you seen or consulted any other health care providers outside of the [address] [place] [address] since your last visit? Include any pap smears or colon screening. No    Does the patient need refills? YES    Learning Assessment Complete?  yes

## 2018-11-03 NOTE — PROGRESS NOTES
I saw and evaluated pt with resident. I reviewed with the resident the patient's medical history and the resident's history and findings on the physical examination. I discussed assessment and plan with the resident and concur with the findings and plan as documented in the resident's note. Pt brought up to me after clinic visit that his father  of colon cancer after diagnosis at age 76 yo. He asked me when he should start colon cancer screening. I advised him to make appt with GI next year when he is 38yo (and he has medicaid coverage) to discuss his family hx with specialist and see if they recommend colonoscopy at age 38, as some medical societies are now suggesting.     Fidel Sloan MD

## 2018-12-06 DIAGNOSIS — J45.40 ASTHMA, CHRONIC, MODERATE PERSISTENT, UNCOMPLICATED: ICD-10-CM

## 2018-12-06 NOTE — TELEPHONE ENCOUNTER
Patient left a message on the [address] today asking about the status of his Advair prescription at [facility]. His LOV was 18. The last Advair prescription is from 10-19-17. It also appears that his Financial Screening for [facility] has  (10-11-18). Since [facility] is not doing Financial Screenings anymore, the patient will need to meet with [facility] to complete a new [facility] application. Routing to Dr. Citlali Akins for review. If the Advair is approved, please send a message to [facility] and to [facility].  Vignesh Lee RN

## 2018-12-07 RX ORDER — FLUTICASONE PROPIONATE AND SALMETEROL 250; 50 UG/1; UG/1
1 POWDER RESPIRATORY (INHALATION) EVERY 12 HOURS
Qty: 3 INHALER | Refills: 3 | Status: SHIPPED | OUTPATIENT
Start: 2018-12-07

## 2018-12-12 ENCOUNTER — TELEPHONE (OUTPATIENT)
Dept: FAMILY MEDICINE CLINIC | Age: 43
End: 2018-12-12

## 2018-12-12 NOTE — TELEPHONE ENCOUNTER
Message left on V/m regarding pt's refill for inhaler to [facility]. Rx was sent to [facility] on ,  But pt's financial screening has  and needs to renewed for [facility]. L/m for pt to call office set an appt for the screening.

## 2018-12-17 NOTE — TELEPHONE ENCOUNTER
Patient called and left message that he has not been able to get his medication for asthma from [facility], chart reviewed. Patient needs a new [facility] application to be completed and faxed. Telephoned patient to home/cell no answer, left message that to call us back and to request an appt. To complete and application for [facility].

## 2018-12-18 DIAGNOSIS — R25.2 MUSCLE CRAMPS: ICD-10-CM

## 2018-12-20 RX ORDER — CYCLOBENZAPRINE HCL 10 MG
TABLET ORAL
Qty: 30 TAB | Refills: 1 | Status: SHIPPED | OUTPATIENT
Start: 2018-12-20

## 2018-12-27 ENCOUNTER — TELEPHONE (OUTPATIENT)
Dept: FAMILY MEDICINE CLINIC | Age: 43
End: 2018-12-27

## 2018-12-27 NOTE — TELEPHONE ENCOUNTER
Pt presented to [facility] clinic but clinic is closing and packing supplies today. Pt states his financial screening at [facility] was redone last Thursday 12/20/18 at [facility]. He is trying to get his Advair from [facility]. Messages left x 2 on [facility] and pharmacy manager numbers. Phone message left on pt numner that I had tried to reach [facility] and had to leave messages.

## 2018-12-27 NOTE — TELEPHONE ENCOUNTER
Per Gerard Rivera. Pt came to the clinic stating he went to [facility] to  medicines and was told there was a problem with financial screening and did not receive the.  I spoke to Maninder More, she said his medicines have been sitting on the pharmacy shelve since 12/17/18 ready to be picked up. Have left 2 voice messages for patient.

## 2019-03-11 DIAGNOSIS — I10 ESSENTIAL HYPERTENSION: ICD-10-CM

## 2019-03-11 RX ORDER — HYDROCHLOROTHIAZIDE 25 MG/1
TABLET ORAL
Qty: 90 TAB | Refills: 1 | Status: SHIPPED | OUTPATIENT
Start: 2019-03-11

## 2019-03-11 NOTE — TELEPHONE ENCOUNTER
Received faxed refill request from pt's pharmacy for HCTZ 25 mg tab take one tab daily for bp/swelling. Routing to Dr. Efrain Boykin for review. Pt's last office visit was [facility] on 11/1/18. Layne Chaidez RN  .

## 2019-03-11 NOTE — TELEPHONE ENCOUNTER
I am filling, but don't see that we mentioned him taking it at 11/1/18 visit, wonder if he is or is just on the lisinopril 5mg daily as reflected in the LOV note. I am happy to fill lisinopril also if needed.

## 2019-03-20 NOTE — TELEPHONE ENCOUNTER
Reached pt. He has a new doctor he is working with and believes he \" has his medicines all straight\". DR Priya Arenas is his new doctor and he was in the hospital for a week. He is pretty sure Dr Goran Pierre does not need to do anything at this time. He will call [facility] office nurse back if there is a problem. Routing to provider for review.

## 2019-12-06 RX ORDER — ROSUVASTATIN CALCIUM 10 MG/1
10 TABLET, COATED ORAL DAILY
COMMUNITY

## 2019-12-09 ENCOUNTER — ANESTHESIA EVENT (OUTPATIENT)
Dept: ENDOSCOPY | Age: 44
End: 2019-12-09
Payer: MEDICAID

## 2019-12-09 ENCOUNTER — ANESTHESIA (OUTPATIENT)
Dept: ENDOSCOPY | Age: 44
End: 2019-12-09
Payer: MEDICAID

## 2019-12-09 ENCOUNTER — HOSPITAL ENCOUNTER (OUTPATIENT)
Age: 44
Setting detail: OUTPATIENT SURGERY
Discharge: HOME | End: 2019-12-09
Attending: INTERNAL MEDICINE | Admitting: INTERNAL MEDICINE
Payer: MEDICAID

## 2019-12-09 VITALS
DIASTOLIC BLOOD PRESSURE: 77 MMHG | SYSTOLIC BLOOD PRESSURE: 121 MMHG | OXYGEN SATURATION: 100 % | HEART RATE: 81 BPM | RESPIRATION RATE: 21 BRPM | TEMPERATURE: 98.2 F | BODY MASS INDEX: 43.71 KG/M2 | HEIGHT: 71 IN | WEIGHT: 312.19 LBS

## 2019-12-09 PROCEDURE — 74011250636 HC RX REV CODE- 250/636: Performed by: INTERNAL MEDICINE

## 2019-12-09 PROCEDURE — 74011250636 HC RX REV CODE- 250/636: Performed by: NURSE ANESTHETIST, CERTIFIED REGISTERED

## 2019-12-09 PROCEDURE — 76040000007: Performed by: INTERNAL MEDICINE

## 2019-12-09 PROCEDURE — 88305 TISSUE EXAM BY PATHOLOGIST: CPT

## 2019-12-09 PROCEDURE — 76060000032 HC ANESTHESIA 0.5 TO 1 HR: Performed by: INTERNAL MEDICINE

## 2019-12-09 PROCEDURE — 74011000250 HC RX REV CODE- 250: Performed by: NURSE ANESTHETIST, CERTIFIED REGISTERED

## 2019-12-09 RX ORDER — SODIUM CHLORIDE 0.9 % (FLUSH) 0.9 %
5-40 SYRINGE (ML) INJECTION AS NEEDED
Status: DISCONTINUED | OUTPATIENT
Start: 2019-12-09 | End: 2019-12-09

## 2019-12-09 RX ORDER — DEXTROMETHORPHAN/PSEUDOEPHED 2.5-7.5/.8
1.2 DROPS ORAL
Status: DISCONTINUED | OUTPATIENT
Start: 2019-12-09 | End: 2019-12-09 | Stop reason: HOSPADM

## 2019-12-09 RX ORDER — NALOXONE HYDROCHLORIDE 0.4 MG/ML
0.4 INJECTION, SOLUTION INTRAMUSCULAR; INTRAVENOUS; SUBCUTANEOUS
Status: DISCONTINUED | OUTPATIENT
Start: 2019-12-09 | End: 2019-12-09 | Stop reason: HOSPADM

## 2019-12-09 RX ORDER — MIDAZOLAM HYDROCHLORIDE 1 MG/ML
.25-5 INJECTION, SOLUTION INTRAMUSCULAR; INTRAVENOUS
Status: DISCONTINUED | OUTPATIENT
Start: 2019-12-09 | End: 2019-12-09 | Stop reason: HOSPADM

## 2019-12-09 RX ORDER — SODIUM CHLORIDE 9 MG/ML
25 INJECTION, SOLUTION INTRAVENOUS CONTINUOUS
Status: DISCONTINUED | OUTPATIENT
Start: 2019-12-09 | End: 2019-12-09 | Stop reason: HOSPADM

## 2019-12-09 RX ORDER — SODIUM CHLORIDE 0.9 % (FLUSH) 0.9 %
5-40 SYRINGE (ML) INJECTION EVERY 8 HOURS
Status: DISCONTINUED | OUTPATIENT
Start: 2019-12-09 | End: 2019-12-09 | Stop reason: HOSPADM

## 2019-12-09 RX ORDER — PROPOFOL 10 MG/ML
INJECTION, EMULSION INTRAVENOUS AS NEEDED
Status: DISCONTINUED | OUTPATIENT
Start: 2019-12-09 | End: 2019-12-09 | Stop reason: HOSPADM

## 2019-12-09 RX ORDER — FLUMAZENIL 0.1 MG/ML
0.2 INJECTION INTRAVENOUS
Status: DISCONTINUED | OUTPATIENT
Start: 2019-12-09 | End: 2019-12-09

## 2019-12-09 RX ORDER — NALOXONE HYDROCHLORIDE 0.4 MG/ML
0.4 INJECTION, SOLUTION INTRAMUSCULAR; INTRAVENOUS; SUBCUTANEOUS
Status: DISCONTINUED | OUTPATIENT
Start: 2019-12-09 | End: 2019-12-09

## 2019-12-09 RX ORDER — SODIUM CHLORIDE 0.9 % (FLUSH) 0.9 %
5-40 SYRINGE (ML) INJECTION AS NEEDED
Status: DISCONTINUED | OUTPATIENT
Start: 2019-12-09 | End: 2019-12-09 | Stop reason: HOSPADM

## 2019-12-09 RX ORDER — ATROPINE SULFATE 0.1 MG/ML
0.5 INJECTION INTRAVENOUS
Status: DISCONTINUED | OUTPATIENT
Start: 2019-12-09 | End: 2019-12-09 | Stop reason: HOSPADM

## 2019-12-09 RX ORDER — SODIUM CHLORIDE 0.9 % (FLUSH) 0.9 %
5-40 SYRINGE (ML) INJECTION EVERY 8 HOURS
Status: DISCONTINUED | OUTPATIENT
Start: 2019-12-09 | End: 2019-12-09

## 2019-12-09 RX ORDER — FLUMAZENIL 0.1 MG/ML
0.2 INJECTION INTRAVENOUS
Status: DISCONTINUED | OUTPATIENT
Start: 2019-12-09 | End: 2019-12-09 | Stop reason: HOSPADM

## 2019-12-09 RX ORDER — FENTANYL CITRATE 50 UG/ML
50 INJECTION, SOLUTION INTRAMUSCULAR; INTRAVENOUS
Status: DISCONTINUED | OUTPATIENT
Start: 2019-12-09 | End: 2019-12-09 | Stop reason: HOSPADM

## 2019-12-09 RX ORDER — LIDOCAINE HYDROCHLORIDE 20 MG/ML
INJECTION, SOLUTION EPIDURAL; INFILTRATION; INTRACAUDAL; PERINEURAL AS NEEDED
Status: DISCONTINUED | OUTPATIENT
Start: 2019-12-09 | End: 2019-12-09 | Stop reason: HOSPADM

## 2019-12-09 RX ORDER — EPINEPHRINE 0.1 MG/ML
1 INJECTION INTRACARDIAC; INTRAVENOUS
Status: DISCONTINUED | OUTPATIENT
Start: 2019-12-09 | End: 2019-12-09 | Stop reason: HOSPADM

## 2019-12-09 RX ADMIN — PROPOFOL 30 MG: 10 INJECTION, EMULSION INTRAVENOUS at 11:38

## 2019-12-09 RX ADMIN — PROPOFOL 50 MG: 10 INJECTION, EMULSION INTRAVENOUS at 11:47

## 2019-12-09 RX ADMIN — PROPOFOL 10 MG: 10 INJECTION, EMULSION INTRAVENOUS at 11:36

## 2019-12-09 RX ADMIN — LIDOCAINE HYDROCHLORIDE 100 MG: 20 INJECTION, SOLUTION EPIDURAL; INFILTRATION; INTRACAUDAL; PERINEURAL at 11:31

## 2019-12-09 RX ADMIN — PROPOFOL 20 MG: 10 INJECTION, EMULSION INTRAVENOUS at 11:33

## 2019-12-09 RX ADMIN — PROPOFOL 30 MG: 10 INJECTION, EMULSION INTRAVENOUS at 11:40

## 2019-12-09 RX ADMIN — SODIUM CHLORIDE 25 ML/HR: 900 INJECTION, SOLUTION INTRAVENOUS at 11:28

## 2019-12-09 RX ADMIN — PROPOFOL 100 MG: 10 INJECTION, EMULSION INTRAVENOUS at 11:45

## 2019-12-09 RX ADMIN — PROPOFOL 70 MG: 10 INJECTION, EMULSION INTRAVENOUS at 11:31

## 2019-12-09 NOTE — H&P
Gastroenterology Outpatient History and Physical    Patient: Illene Bone    Physician: Nikki Coleman MD    Chief Complaint: rectal bleeding  History of Present Illness: 39 yo M with intermittent rectal bleeding, also a family history of colon cancer, in his father. History:  Past Medical History:   Diagnosis Date    Asthma     HTN (hypertension)     Morbid obesity ([facility]     Opiate abuse, episodic (HCC)       Past Surgical History:   Procedure Laterality Date    HX ADENOIDECTOMY        Social History     Socioeconomic History    Marital status:      Spouse name: Not on file    Number of children: Not on file    Years of education: Not on file    Highest education level: Not on file   Tobacco Use    Smoking status: Never Smoker    Smokeless tobacco: Never Used   Substance and Sexual Activity    Alcohol use: Yes     Alcohol/week: 0.0 standard drinks     Comment: occ    Drug use: Yes     Types: Opiates, Marijuana     Comment: marijuana currently opiates not current    Social History Narrative    2015: worked previously as a  , currently unemployed while in a methadone treatment program, he is  and lives with his wife. Family History   Problem Relation Age of Onset    COPD Mother     Colon Cancer Father          at 76      Patient Active Problem List   Diagnosis Code    Essential hypertension I10    Chronic bronchitis (HCC) J42    Moderate persistent chronic asthma without complication A71.26    Seasonal allergic rhinitis J30.2    Severe obesity (BMI 35.0-39. 9) with comorbidity ([facility] E66.01       Allergies: Allergies   Allergen Reactions    Ibuprofen Other (comments)     Increased serum creatinine     Medications:   Prior to Admission medications    Medication Sig Start Date End Date Taking? Authorizing Provider   tiotropium (SPIRIVA WITH HANDIHALER) 18 mcg inhalation capsule Take 1 Cap by inhalation daily.    Yes Provider, Historical   rosuvastatin (CRESTOR) 10 mg tablet Take 10 mg by mouth daily. Yes Provider, Historical   hydroCHLOROthiazide (HYDRODIURIL) 25 mg tablet TAKE 1 TABLET BY MOUTH ONCE DAILY FOR BLOOD PRESSURE/SWELLING 3/11/19  Yes Ron Corley MD   cyclobenzaprine (FLEXERIL) 10 mg tablet TAKE 1 TABLET BY MOUTH THREE TIMES DAILY AS NEEDED FOR MUSCLE SPASM 12/20/18  Yes Dylan Cheatham PA   fluticasone-salmeterol (ADVAIR DISKUS) 250-50 mcg/dose diskus inhaler Take 1 Puff by inhalation every twelve (12) hours. 12/7/18  Yes Liane Will MD   albuterol (PROVENTIL VENTOLIN) 2.5 mg /3 mL (0.083 %) nebulizer solution 3 mL by Nebulization route every four (4) hours as needed for Wheezing or Shortness of Breath. 11/1/18  Yes Liane Will MD   lisinopril (PRINIVIL, ZESTRIL) 5 mg tablet Take 1 Tab by mouth daily. 8/30/18  Yes Ron Corley MD   montelukast (SINGULAIR) 10 mg tablet take 1 tablet by mouth once daily 8/30/18  Yes Liane Will MD   mometasone (NASONEX) 50 mcg/actuation nasal spray 2 Sprays by Both Nostrils route daily. 8/30/18  Yes Liane Will MD   albuterol (VENTOLIN HFA) 90 mcg/actuation inhaler Take 2 Puffs by inhalation every four (4) hours as needed for Wheezing. 10/19/17  Yes Keisha Crowley, LUZ     Physical Exam:   Vital Signs: There were no vitals taken for this visit.   General: well developed, well nourished   HEENT: unremarkable   Heart: regular rhythm no mumur    Lungs: clear   Abdominal:  benign   Neurological: unremarkable   Extremities: no edema     Findings/Diagnosis: rectal bleedign  Plan of Care/Planned Procedure: colonoscopy with conscious/deep sedation    Signed:  Chris Dixon MD 12/9/2019

## 2019-12-09 NOTE — ROUTINE PROCESS
Venice Haim 1975 
[id number] Situation: 
Verbal report received from: [employer] Procedure: Procedure(s): 
COLONOSCOPY Background: 
 
Preoperative diagnosis: RECTAL BLEEDING, IRRITABLE BOWEL SYNDROME WITH CONSTIPATION Postoperative diagnosis: hemorrhoids :  Dr. Justo Hardy Assistant(s): Endoscopy Technician-1: Juan Rocha Endoscopy RN-1: Susie Malloy RN Specimens:  
ID Type Source Tests Collected by Time Destination 1 : transverse colon polyp biopsy Preservative Colon, Transverse  Bee Cortez MD 12/9/2019 1141 Pathology H. Pylori  no Assessment: Anesthesia gave intra-procedure sedation and medications, see anesthesia flow sheet yes Intravenous fluids: NS@ [facility] Vital signs stable Abdominal assessment: round and soft Recommendation: 
Discharge patient per MD order. Family or Friend Permission to share finding with family or friend yes

## 2019-12-09 NOTE — PERIOP NOTES
IV infiltrated during colonoscopy. Patient moving and fighting; scraped right leg below knee on bed.   No bleeding noted

## 2019-12-09 NOTE — ANESTHESIA PREPROCEDURE EVALUATION
Relevant Problems   No relevant active problems       Anesthetic History   No history of anesthetic complications            Review of Systems / Medical History  Patient summary reviewed, nursing notes reviewed and pertinent labs reviewed    Pulmonary  Within defined limits          Asthma        Neuro/Psych   Within defined limits           Cardiovascular  Within defined limits  Hypertension              Exercise tolerance: >4 METS     GI/Hepatic/Renal  Within defined limits              Endo/Other  Within defined limits      Morbid obesity     Other Findings   Comments: Hx Opiate abuse           Physical Exam    Airway  Mallampati: II  TM Distance: 4 - 6 cm  Neck ROM: normal range of motion   Mouth opening: Normal     Cardiovascular  Regular rate and rhythm,  S1 and S2 normal,  no murmur, click, rub, or gallop             Dental  No notable dental hx       Pulmonary  Breath sounds clear to auscultation               Abdominal  GI exam deferred       Other Findings            Anesthetic Plan    ASA: 2  Anesthesia type: general and total IV anesthesia          Induction: Intravenous  Anesthetic plan and risks discussed with: Patient      Propofol MAC

## 2019-12-09 NOTE — PERIOP NOTES
Anesthesia reports 310mg Propofol, 100mg Lidocaine and 500mL NS given during procedure. Received report from anesthesia staff on vital signs and status of patient.

## 2019-12-09 NOTE — PROCEDURES
NAME:  Enedina Kapoor   :   1975   MRN:   [id number]     Date/Time:  2019 11:27 AM    Colonoscopy Operative Report    Procedure Type:  Colonoscopy with polypectomy (cold biopsy)     Indications: colon cancer screening  Pre-operative Diagnosis: see indication above  Post-operative Diagnosis:  See findings below  :  Selina Holt MD  Referring Provider: Eboni Mccann MD    Exam:  Airway: clear, no airway problems anticipated  Heart: RRR, without gallops or rubs  Lungs: clear bilaterally without wheezes, crackles, or rhonchi  Abdomen: soft, nontender, nondistended, bowel sounds present  Mental Status: awake, alert and oriented to person, place and time    Sedation:  MAC anesthesia Propofol  Procedure Details:  After informed consent was obtained with all risks and benefits of procedure explained and preoperative exam completed, the patient was taken to the endoscopy suite and placed in the left lateral decubitus position. Upon sequential sedation as per above, a digital rectal exam was performed demonstrating internal and external hemorrhoids. The Olympus videocolonoscope  was inserted in the rectum and carefully advanced to the cecum, which was identified by the ileocecal valve and appendiceal orifice. The quality of preparation was fair. The colonoscope was slowly withdrawn with careful evaluation between folds. Retroflexion in the rectum was completed demonstrating internal and external hemorrhoids. Findings:   ANUS: Anal exam reveals no masses but hemorrhoids, sphincter tone is normal.   RECTUM: Rectal exam reveals no masses. Prominent external and grade II/III hemorrhoids. SIGMOID COLON: The mucosa is normal with good vascular pattern and without ulcers, diverticula, and polyps. DESCENDING COLON: The mucosa is normal with good vascular pattern and without ulcers, diverticula, and polyps.    TRANSVERSE COLON: 0.3 cm colon polyp, removed with cold biopsy forceps  ASCENDING COLON: The mucosa is normal with good vascular pattern and without ulcers, diverticula, and polyps. CECUM: The appendiceal orifice appears normal. The ileocecal valve appears normal.   TERMINAL ILEUM: The terminal ileum was not entered. Specimen Removed:  Transverse colon polyp  Complications:  None. EBL:     None. Impression:  -- small colon polyp, removed as above  -- sub-optimal bowel preparation  -- large hemorrhoids, the likely source of recent hematochezia  -- family history of colon cancer (father)    Recommendations:   -- high fiber diet    -- await pathology  -- repeat colonoscopy 5 years  -- hemorrhoid banding in office if bleeding recurrent    Discharge Disposition:  Home in the company of a  when able to ambulate.       Jean-Claude Parikh MD

## 2019-12-09 NOTE — ANESTHESIA POSTPROCEDURE EVALUATION
Procedure(s):  COLONOSCOPY. general, total IV anesthesia    Anesthesia Post Evaluation        Patient location during evaluation: PACU  Note status: Adequate. Level of consciousness: responsive to verbal stimuli and sleepy but conscious  Pain management: satisfactory to patient  Airway patency: patent  Anesthetic complications: no  Cardiovascular status: acceptable  Respiratory status: acceptable  Hydration status: acceptable  Comments: +Post-Anesthesia Evaluation and Assessment    Patient: Douglas Meza MRN: [id number]  SSN: [id number]   YOB: 1975  Age: 38 y.o. Sex: male      Cardiovascular Function/Vital Signs    /77   Pulse 81   Temp 36.8 °C (98.2 °F)   Resp 21   Ht 5' 11\" (1.803 m)   Wt 141.6 kg (312 lb 3 oz)   SpO2 100%   BMI 43.54 kg/m²     Patient is status post Procedure(s):  COLONOSCOPY. Nausea/Vomiting: Controlled. Postoperative hydration reviewed and adequate. Pain:  Pain Scale 1: Visual (12/09/19 1206)  Pain Intensity 1: 0 (12/09/19 1206)   Managed. Neurological Status: At baseline. Mental Status and Level of Consciousness: Arousable. Pulmonary Status:   O2 Device: Room air (12/09/19 1211)   Adequate oxygenation and airway patent. Complications related to anesthesia: None    Post-anesthesia assessment completed. No concerns. Signed By: Amy Lux MD    12/9/2019  Post anesthesia nausea and vomiting:  controlled      Vitals Value Taken Time   /79 12/9/2019 12:26 PM   Temp 36.8 °C (98.2 °F) 12/9/2019 12:06 PM   Pulse 83 12/9/2019 12:26 PM   Resp 16 12/9/2019 12:26 PM   SpO2 95 % 12/9/2019 12:26 PM   Vitals shown include unvalidated device data.

## 2019-12-09 NOTE — DISCHARGE INSTRUCTIONS
Kodak Rebolledo  [id number]  1975    COLON DISCHARGE INSTRUCTIONS  Discomfort:  Redness at IV site- apply warm compress to area; if redness or soreness persist- contact your physician  There may be a slight amount of blood passed from the rectum  Gaseous discomfort- walking, belching will help relieve any discomfort  You may not operate a vehicle for 12 hours  You may not engage in an occupation involving machinery or appliances for rest of today  You may not drink alcoholic beverages for at least 12 hours  Avoid making any critical decisions for at least 24 hour  DIET:   High fiber diet. - however -  remember your colon is empty and a heavy meal will produce gas. Avoid these foods:  vegetables, fried / greasy foods, carbonated drinks for today  MEDICATION:  (See attached)     ACTIVITY:  You may not resume your normal daily activities until tomorrow AM; it is recommended that you spend the remainder of the day resting -  avoid any strenuous activity. CALL M.D. ANY SIGN OF:   Increasing pain, nausea, vomiting  Abdominal distension (swelling)  New increased bleeding (oral or rectal)  Fever (chills)  Pain in chest area  Bloody discharge from nose or mouth  Shortness of breath    IMPRESSION:  -- one colon polyp, completely removed today. -- not a perfect bowel prep, still stool throughout the colon  -- we saw some hemorrhoids, which are very common, and these are swollen veins at the anal canal or end of the rectum, which can bulge with constipation and straining or frequent bowel movements. Sometimes they cause bleeding, burning, pressure, or even pain. A diet high in fiber helps, but using a daily fiber supplement (like 2 teaspoons of psyllium husk powder or metamucil) can help treat these.     Follow-up Instructions:   Call Dr. Debbi Downey for the results of procedure / biopsy in 7-10 days  Telephone # [phone]  Repeat colonoscopy in 5 years    Chris Dixon MD

## 2021-10-28 NOTE — TELEPHONE ENCOUNTER
Incoming fax requesting refill of cyclobenzaprine 10mg tablet #30. Last office visit January 2017. F/s utd.
Purple DH (Discharge Huddle; Vulnerable Patient)

## 2022-03-19 PROBLEM — E66.01 SEVERE OBESITY (BMI 35.0-39.9) WITH COMORBIDITY (HCC): Status: ACTIVE | Noted: 2018-03-29

## 2022-03-19 PROBLEM — J30.2 SEASONAL ALLERGIC RHINITIS: Status: ACTIVE | Noted: 2017-04-26

## 2022-06-09 ENCOUNTER — HOSPITAL ENCOUNTER (OUTPATIENT)
Age: 47
Setting detail: OUTPATIENT SURGERY
Discharge: HOME | End: 2022-06-09
Attending: INTERNAL MEDICINE | Admitting: INTERNAL MEDICINE
Payer: MEDICAID

## 2022-06-09 VITALS
DIASTOLIC BLOOD PRESSURE: 66 MMHG | OXYGEN SATURATION: 95 % | RESPIRATION RATE: 16 BRPM | SYSTOLIC BLOOD PRESSURE: 119 MMHG | HEART RATE: 85 BPM

## 2022-06-09 PROCEDURE — 76040000007: Performed by: INTERNAL MEDICINE

## 2022-06-09 PROCEDURE — 2709999900 HC NON-CHARGEABLE SUPPLY: Performed by: INTERNAL MEDICINE

## 2022-06-09 PROCEDURE — 77030040810 HC CATH BLLN ANORECT EXPULSN MUIS -B: Performed by: INTERNAL MEDICINE

## 2022-06-09 NOTE — DISCHARGE INSTRUCTIONS
Ladarius Pennie  [id number]  1975      MANOMETRY DISCHARGE INSTRUCTION    You may resume your regular diet as tolerated. You may resume your normal daily activities. Call your Physician if you have any complications or questions. [employer] Activation    Thank you for requesting access to [employer]. Please follow the instructions below to securely access and download your online medical record. [employer] allows you to send messages to your doctor, view your test results, renew your prescriptions, schedule appointments, and more. How Do I Sign Up? 1. In your internet browser, go to [email or web address] Pirindola  2. Click on the First Time User? Click Here link in the Sign In box. You will be redirect to the New Member Sign Up page. 3. Enter your [employer] Access Code exactly as it appears below. You will not need to use this code after youve completed the sign-up process. If you do not sign up before the expiration date, you must request a new code. [employer] Access Code: [email or web address]  Expires: 2022 10:07 AM (This is the date your [employer] access code will )    4. Enter the last four digits of your Social Security Number (xxxx) and Date of Birth (mm/dd/yyyy) as indicated and click Submit. You will be taken to the next sign-up page. 5. Create a [employer] ID. This will be your [employer] login ID and cannot be changed, so think of one that is secure and easy to remember. 6. Create a [employer] password. You can change your password at any time. 7. Enter your Password Reset Question and Answer. This can be used at a later time if you forget your password. 8. Enter your e-mail address. You will receive e-mail notification when new information is available in [address] 9. Click Sign Up. You can now view and download portions of your medical record. 10. Click the Download Summary menu link to download a portable copy of your medical information.     Additional Information    If you have questions, please visit the Frequently Asked Questions section of the [employer] website at [email or web address] Remember, [employer] is NOT to be used for urgent needs. For medical emergencies, dial 911.

## 2022-06-09 NOTE — PROGRESS NOTES
Rectal exam done by Natacha Pandey RN. Anal manometry catheter inserted into rectum. Manometry procedure complete. Catheter inserted into rectum. Balloon filled with 40cc of luke warm H2O, and pt escorted to bathroom for 5 min expulsion test.  Pt was able to expel balloon. Balloon deflated and catheter removed. Pt tolerated procedures well.

## 2022-06-17 NOTE — PROCEDURES
High Resolution Anorectal Manometry   With Balloon Expulsion [facility] ([facility])    Date of procedure: 06/9/22  Date of interpretation: 06/17/22    Patient Name: Jean Carlos Cornelius  Primary GI: Elina Silva MD    Indication/Pre-procedure diagnosis: chronic idiopathic constipation     Description of procedure: after informed consent, anorectal manometry performed with catheter inserted into rectum and asked to bear down, squeeze and describe sensation as outlined below during maneuvers. Then, balloon expulsion testing performed    Findings:    Resting  Max. Sphincter Pressure Pressure (rectal ref.)  87.3 mmHg  Mean Spincter Pressure (rectal ref.)    111.6 mmHg   Max. Sphincter Pressure Pressure (abs. ref.)  101.7 mmHg  Mean Spincter Pressure (abs ref.)    126.0 mmHg   Length of HPZ:     3.8 cm   Length verge to center:    2.5 cm     Bear Down (attempted defecation):  Residual Anal Pressure (abs. Ref.):   193.5 mmHg  Percent Anal relaxation:    16 %  Intrarectal pressure:     140.2 mmHg  Rectoanal pressure differential:   -53.3 mmHg    Squeeze:  Max. Sphincter Pressure Pressure (rectal ref.)  251.9 mmHg  Max . Spincter Pressure (abd ref.)    285.4 mmHg   Duration of sustained squeeze   6.2 s    Balloon Inflation:  RAIR:       present  First Sensation:     40 cc  Urge to defecate:     80 cc  Discomfort:      130 cc  Min. Rectal compliance:    0.88 cc/mmHg  Max.  Rectal compliance:    1.62 cc/mmHg    Balloon Expulsion Testing:   passed    Impression:  Normal IAS (> 30 mmHg)  Normal Anal Squeeze Pressure (>30 mmHg)  Abnormal Rectal Sensation Threshold for first sensation (< or = 20 mL)  Normal Threshold for Urge to defecate ( mL)  Abnormal threshold for Discomfort (200 mL)    Comments:  1) negative rectoanal pressure differential, consistent with anismus motor pattern or outlet dysfunction constipation  2) good chance to respond to biofeedback therapy  3) RAIR present, not Hirschsprung's

## 2023-11-08 ENCOUNTER — HOSPITAL ENCOUNTER (EMERGENCY)
Facility: HOSPITAL | Age: 48
Discharge: HOME | End: 2023-11-08
Attending: EMERGENCY MEDICINE
Payer: MEDICAID

## 2023-11-08 VITALS
OXYGEN SATURATION: 99 % | BODY MASS INDEX: 37.13 KG/M2 | HEIGHT: 71 IN | HEART RATE: 81 BPM | RESPIRATION RATE: 15 BRPM | WEIGHT: 265.21 LBS | SYSTOLIC BLOOD PRESSURE: 128 MMHG | TEMPERATURE: 98 F | DIASTOLIC BLOOD PRESSURE: 77 MMHG

## 2023-11-08 DIAGNOSIS — D50.0 BLOOD LOSS ANEMIA: Primary | ICD-10-CM

## 2023-11-08 DIAGNOSIS — K64.9 BLEEDING HEMORRHOIDS: ICD-10-CM

## 2023-11-08 LAB
ALBUMIN SERPL-MCNC: 3.7 G/DL (ref 3.5–5)
ALBUMIN/GLOB SERPL: 1 (ref 1.1–2.2)
ALP SERPL-CCNC: 74 U/L (ref 45–117)
ALT SERPL-CCNC: 18 U/L (ref 12–78)
ANION GAP SERPL CALC-SCNC: 6 MMOL/L (ref 5–15)
AST SERPL-CCNC: 16 U/L (ref 15–37)
BASOPHILS # BLD: 0 K/UL (ref 0–0.1)
BASOPHILS NFR BLD: 1 % (ref 0–1)
BILIRUB SERPL-MCNC: 0.6 MG/DL (ref 0.2–1)
BUN SERPL-MCNC: 14 MG/DL (ref 6–20)
BUN/CREAT SERPL: 9 (ref 12–20)
CALCIUM SERPL-MCNC: 9.1 MG/DL (ref 8.5–10.1)
CHLORIDE SERPL-SCNC: 102 MMOL/L (ref 97–108)
CO2 SERPL-SCNC: 30 MMOL/L (ref 21–32)
CREAT SERPL-MCNC: 1.52 MG/DL (ref 0.7–1.3)
DIFFERENTIAL METHOD BLD: ABNORMAL
EOSINOPHIL # BLD: 0.1 K/UL (ref 0–0.4)
EOSINOPHIL NFR BLD: 2 % (ref 0–7)
ERYTHROCYTE [DISTWIDTH] IN BLOOD BY AUTOMATED COUNT: 14.7 % (ref 11.5–14.5)
FERRITIN SERPL-MCNC: 3 NG/ML (ref 26–388)
GLOBULIN SER CALC-MCNC: 3.7 G/DL (ref 2–4)
GLUCOSE SERPL-MCNC: 119 MG/DL (ref 65–100)
HCT VFR BLD AUTO: 21.3 % (ref 36.6–50.3)
HEMOCCULT STL QL: NEGATIVE
HGB BLD-MCNC: 6 G/DL (ref 12.1–17)
HISTORY CHECK: NORMAL
IMM GRANULOCYTES # BLD AUTO: 0 K/UL (ref 0–0.04)
IMM GRANULOCYTES NFR BLD AUTO: 0 % (ref 0–0.5)
IRON SATN MFR SERPL: 3 % (ref 20–50)
IRON SERPL-MCNC: 12 UG/DL (ref 35–150)
LYMPHOCYTES # BLD: 1.3 K/UL (ref 0.8–3.5)
LYMPHOCYTES NFR BLD: 19 % (ref 12–49)
MCH RBC QN AUTO: 21.4 PG (ref 26–34)
MCHC RBC AUTO-ENTMCNC: 28.2 G/DL (ref 30–36.5)
MCV RBC AUTO: 76.1 FL (ref 80–99)
MONOCYTES # BLD: 0.5 K/UL (ref 0–1)
MONOCYTES NFR BLD: 7 % (ref 5–13)
NEUTS SEG # BLD: 5 K/UL (ref 1.8–8)
NEUTS SEG NFR BLD: 71 % (ref 32–75)
NRBC # BLD: 0 K/UL (ref 0–0.01)
NRBC BLD-RTO: 0 PER 100 WBC
PLATELET # BLD AUTO: 263 K/UL (ref 150–400)
PMV BLD AUTO: 9.3 FL (ref 8.9–12.9)
POTASSIUM SERPL-SCNC: 3.6 MMOL/L (ref 3.5–5.1)
PROT SERPL-MCNC: 7.4 G/DL (ref 6.4–8.2)
RBC # BLD AUTO: 2.8 M/UL (ref 4.1–5.7)
SODIUM SERPL-SCNC: 138 MMOL/L (ref 136–145)
TIBC SERPL-MCNC: 428 UG/DL (ref 250–450)
WBC # BLD AUTO: 7.1 K/UL (ref 4.1–11.1)

## 2023-11-08 PROCEDURE — 85025 COMPLETE CBC W/AUTO DIFF WBC: CPT

## 2023-11-08 PROCEDURE — 83550 IRON BINDING TEST: CPT

## 2023-11-08 PROCEDURE — 86870 RBC ANTIBODY IDENTIFICATION: CPT

## 2023-11-08 PROCEDURE — 86901 BLOOD TYPING SEROLOGIC RH(D): CPT

## 2023-11-08 PROCEDURE — 86923 COMPATIBILITY TEST ELECTRIC: CPT

## 2023-11-08 PROCEDURE — 86850 RBC ANTIBODY SCREEN: CPT

## 2023-11-08 PROCEDURE — 86900 BLOOD TYPING SEROLOGIC ABO: CPT

## 2023-11-08 PROCEDURE — P9016 RBC LEUKOCYTES REDUCED: HCPCS

## 2023-11-08 PROCEDURE — 80053 COMPREHEN METABOLIC PANEL: CPT

## 2023-11-08 PROCEDURE — 82272 OCCULT BLD FECES 1-3 TESTS: CPT

## 2023-11-08 PROCEDURE — 99285 EMERGENCY DEPT VISIT HI MDM: CPT

## 2023-11-08 PROCEDURE — 6370000000 HC RX 637 (ALT 250 FOR IP): Performed by: EMERGENCY MEDICINE

## 2023-11-08 PROCEDURE — 36430 TRANSFUSION BLD/BLD COMPNT: CPT

## 2023-11-08 PROCEDURE — 82728 ASSAY OF FERRITIN: CPT

## 2023-11-08 PROCEDURE — 36415 COLL VENOUS BLD VENIPUNCTURE: CPT

## 2023-11-08 PROCEDURE — 83540 ASSAY OF IRON: CPT

## 2023-11-08 RX ORDER — GABAPENTIN 300 MG/1
300 CAPSULE ORAL
Status: COMPLETED | OUTPATIENT
Start: 2023-11-08 | End: 2023-11-08

## 2023-11-08 RX ORDER — GABAPENTIN 300 MG/1
300 CAPSULE ORAL 3 TIMES DAILY
COMMUNITY

## 2023-11-08 RX ORDER — SODIUM CHLORIDE 9 MG/ML
INJECTION, SOLUTION INTRAVENOUS PRN
Status: DISCONTINUED | OUTPATIENT
Start: 2023-11-08 | End: 2023-11-08

## 2023-11-08 RX ORDER — CYCLOBENZAPRINE HCL 10 MG
10 TABLET ORAL
Status: COMPLETED | OUTPATIENT
Start: 2023-11-08 | End: 2023-11-08

## 2023-11-08 RX ORDER — SODIUM CHLORIDE 9 MG/ML
INJECTION, SOLUTION INTRAVENOUS PRN
Status: DISCONTINUED | OUTPATIENT
Start: 2023-11-08 | End: 2023-11-09 | Stop reason: HOSPADM

## 2023-11-08 RX ORDER — FERROUS SULFATE 325(65) MG
325 TABLET ORAL
Qty: 30 TABLET | Refills: 0 | Status: SHIPPED | OUTPATIENT
Start: 2023-11-08 | End: 2023-12-08

## 2023-11-08 RX ADMIN — GABAPENTIN 300 MG: 300 CAPSULE ORAL at 20:36

## 2023-11-08 RX ADMIN — CYCLOBENZAPRINE 10 MG: 10 TABLET, FILM COATED ORAL at 20:36

## 2023-11-08 ASSESSMENT — PAIN - FUNCTIONAL ASSESSMENT: PAIN_FUNCTIONAL_ASSESSMENT: NONE - DENIES PAIN

## 2023-11-08 ASSESSMENT — LIFESTYLE VARIABLES
HOW MANY STANDARD DRINKS CONTAINING ALCOHOL DO YOU HAVE ON A TYPICAL DAY: PATIENT DOES NOT DRINK
HOW OFTEN DO YOU HAVE A DRINK CONTAINING ALCOHOL: NEVER

## 2023-11-09 LAB
ABO + RH BLD: NORMAL
BLD PROD TYP BPU: NORMAL
BLOOD BANK DISPENSE STATUS: NORMAL
BLOOD GROUP ANTIBODIES SERPL: NORMAL
BLOOD GROUP ANTIBODIES SERPL: NORMAL
BPU ID: NORMAL
CROSSMATCH RESULT: NORMAL
SPECIMEN EXP DATE BLD: NORMAL
UNIT DIVISION: 0

## 2023-11-09 NOTE — DISCHARGE INSTRUCTIONS
Please make a followup with your primary care physician, gastroenterolist, colorectal specialist (for your hemorrhoids), and hematologist (for your severe iron-deficiency anemia). If you have any new or worsening concerning medical symptoms please return to the emergency department.

## 2023-11-09 NOTE — ED NOTES
Patient discharged from the ED by Dr Zoe Jackson. Diagnosis, medications, precautions and follow-ups were reviewed with the patient/family. Questions were asked and answered prior to departure. Patient departed the ED via ambulatory and was accompanied by wife.        Felipe Hunt RN  11/08/23 4645

## 2023-11-09 NOTE — ED PROVIDER NOTES
signed)    (Please note that parts of this dictation were completed with voice recognition software. Quite often unanticipated grammatical, syntax, homophones, and other interpretive errors are inadvertently transcribed by the computer software. Please disregards these errors.  Please excuse any errors that have escaped final proofreading.)        Jeanie Curiel MD  11/11/23 7855

## 2023-11-27 ENCOUNTER — TELEPHONE (OUTPATIENT)
Age: 48
End: 2023-11-27

## 2023-11-27 NOTE — TELEPHONE ENCOUNTER
----- Message from Jolly Beckford sent at 11/14/2023 11:44 AM EST -----  Regarding: FW: new heme referral  Here is the Pt I sent you in Teams about. She knows she is coming to [facility] and prefers it too (closer)  ----- Message -----  From: Jolly Montelongo  Sent: 11/10/2023   9:23 AM EST  To: Jolly Beckford; Oliver SWAN RN  Subject: new heme referral                                F/u from transfusion in the ER, suggested he follow up with Dr. Amaya. Wife called Edgar .

## 2023-11-27 NOTE — TELEPHONE ENCOUNTER
Reach out to patient and spouse on the following number [phone] Edgar and patient [phone].  To schedule new Hematology appt per message from our [facility].  Appt has been set but need patient to confirm appt

## 2023-12-04 ENCOUNTER — TELEPHONE (OUTPATIENT)
Age: 48
End: 2023-12-04

## 2023-12-14 NOTE — PROGRESS NOTES
Jus at [address], [address] [place] [address]  W: [phone]  F: [phone]    Reason for Visit:   Olya Martinez is a 51 y.o. male with asthma and hypertension who is seen in consultation at the request of Dr. Go Barbosa for evaluation of anemia. History of Present Illness:   Olya Martinez is a pleasant 51 y.o. male who presents today for evaluation of anemia. Patient had labs done by PCP in November that showed hemoglobin 6.4. PCP directed him to the ER. Labs on 23 showed 6.0. At that evaluation, patient reported that he has history of hemorrhoids that had been bleeding but stopped 3 days prior to presentation. He was given 1 unit PRBCs and discharged from the ER. In the interval, he saw Dr. Aure Maloney (colorectal surgery) about hemorrhoidal bleeding. He is set up for a colonoscopy and endoscopy on . Pending that evaluation, he will go for hemorrhoidectomy. His wife also notes that he is yellow. States that this has been going on for months. He is taking ferrous sulfate 325 mg daily since ER visit in November. He is having nausea with the iron. He also reports dark stool. He will throw up if he takes his iron with other medications. He denies pica or pagophagia. Reports cold intolerance. Dyspnea on exertion improved after the 1 unit of blood at  ER visit. Wife does note that he has less stamina. He had colonoscopy with 1 polyp resected. Family History:  No family history of anemia. Father - colon cancer,  at age 76  Mother - ovarian cancer,     Social History:  . Has 1 son and 1 daughter. 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 No smoker. No EtOH use. +Marijuana use. He seldomly eats red meat because it causes of GI upset. He has previously been a vegetarian but now eats chicken and fish. Review of systems was obtained and pertinent findings reviewed above.  Past medical history, social history, family history,

## 2023-12-15 ENCOUNTER — OFFICE VISIT (OUTPATIENT)
Age: 48
End: 2023-12-15
Payer: MEDICAID

## 2023-12-15 ENCOUNTER — TELEPHONE (OUTPATIENT)
Age: 48
End: 2023-12-15

## 2023-12-15 VITALS
HEART RATE: 109 BPM | RESPIRATION RATE: 16 BRPM | WEIGHT: 263.9 LBS | SYSTOLIC BLOOD PRESSURE: 112 MMHG | BODY MASS INDEX: 36.81 KG/M2 | DIASTOLIC BLOOD PRESSURE: 78 MMHG | OXYGEN SATURATION: 94 %

## 2023-12-15 DIAGNOSIS — D50.9 IRON DEFICIENCY ANEMIA, UNSPECIFIED IRON DEFICIENCY ANEMIA TYPE: ICD-10-CM

## 2023-12-15 DIAGNOSIS — K64.8 INTERNAL HEMORRHOID: ICD-10-CM

## 2023-12-15 DIAGNOSIS — D50.9 IRON DEFICIENCY ANEMIA, UNSPECIFIED IRON DEFICIENCY ANEMIA TYPE: Primary | ICD-10-CM

## 2023-12-15 PROCEDURE — 99204 OFFICE O/P NEW MOD 45 MIN: CPT | Performed by: INTERNAL MEDICINE

## 2023-12-15 PROCEDURE — 3078F DIAST BP <80 MM HG: CPT | Performed by: INTERNAL MEDICINE

## 2023-12-15 PROCEDURE — 3074F SYST BP LT 130 MM HG: CPT | Performed by: INTERNAL MEDICINE

## 2023-12-15 NOTE — PROGRESS NOTES
Olya Martinez is a 51 y.o. male     Chief Complaint   Patient presents with    Follow-up     anemia       1. Have you been to the ER, urgent care clinic since your last visit? Hospitalized since your last visit? No    2. Have you seen or consulted any other health care providers outside of the [address] [place] [address] Avenue since your last visit? Include any pap smears or colon screening.  No

## 2023-12-16 LAB
ALBUMIN SERPL-MCNC: 4.2 G/DL (ref 4.1–5.1)
ALP SERPL-CCNC: 85 IU/L (ref 44–121)
ALT SERPL-CCNC: 34 IU/L (ref 0–44)
AST SERPL-CCNC: 24 IU/L (ref 0–40)
BILIRUB DIRECT SERPL-MCNC: 0.19 MG/DL (ref 0–0.4)
BILIRUB SERPL-MCNC: 0.6 MG/DL (ref 0–1.2)
FERRITIN SERPL-MCNC: 90 NG/ML (ref 30–400)
FOLATE SERPL-MCNC: 13.2 NG/ML
IRON SATN MFR SERPL: 10 % (ref 15–55)
IRON SERPL-MCNC: 40 UG/DL (ref 38–169)
PROT SERPL-MCNC: 6.8 G/DL (ref 6–8.5)
TIBC SERPL-MCNC: 387 UG/DL (ref 250–450)
UIBC SERPL-MCNC: 347 UG/DL (ref 111–343)
VIT B12 SERPL-MCNC: 683 PG/ML (ref 232–1245)

## 2023-12-17 LAB
BASOPHILS # BLD AUTO: 0.1 X10E3/UL (ref 0–0.2)
BASOPHILS NFR BLD AUTO: 1 %
EOSINOPHIL # BLD AUTO: 0.3 X10E3/UL (ref 0–0.4)
EOSINOPHIL NFR BLD AUTO: 4 %
HCT VFR BLD AUTO: 39.7 % (ref 37.5–51)
HGB BLD-MCNC: 11.7 G/DL (ref 13–17.7)
IMM GRANULOCYTES # BLD AUTO: 0 X10E3/UL (ref 0–0.1)
IMM GRANULOCYTES NFR BLD AUTO: 0 %
LYMPHOCYTES # BLD AUTO: 1.7 X10E3/UL (ref 0.7–3.1)
LYMPHOCYTES NFR BLD AUTO: 22 %
MCH RBC QN AUTO: 24.9 PG (ref 26.6–33)
MCHC RBC AUTO-ENTMCNC: 29.5 G/DL (ref 31.5–35.7)
MCV RBC AUTO: 85 FL (ref 79–97)
MONOCYTES # BLD AUTO: 0.5 X10E3/UL (ref 0.1–0.9)
MONOCYTES NFR BLD AUTO: 7 %
MORPHOLOGY BLD-IMP: ABNORMAL
NEUTROPHILS # BLD AUTO: 5 X10E3/UL (ref 1.4–7)
NEUTROPHILS NFR BLD AUTO: 66 %
PLATELET # BLD AUTO: 301 X10E3/UL (ref 150–450)
RBC # BLD AUTO: 4.7 X10E6/UL (ref 4.14–5.8)
RETICS/RBC NFR AUTO: 2.4 % (ref 0.6–2.6)
WBC # BLD AUTO: 7.7 X10E3/UL (ref 3.4–10.8)

## 2023-12-21 ENCOUNTER — HOSPITAL ENCOUNTER (OUTPATIENT)
Facility: HOSPITAL | Age: 48
Setting detail: INFUSION SERIES
Discharge: HOME | End: 2023-12-21
Payer: MEDICAID

## 2023-12-21 VITALS
TEMPERATURE: 98.7 F | WEIGHT: 255 LBS | HEIGHT: 71 IN | SYSTOLIC BLOOD PRESSURE: 139 MMHG | HEART RATE: 71 BPM | BODY MASS INDEX: 35.7 KG/M2 | RESPIRATION RATE: 18 BRPM | DIASTOLIC BLOOD PRESSURE: 90 MMHG

## 2023-12-21 DIAGNOSIS — D50.9 IRON DEFICIENCY ANEMIA, UNSPECIFIED IRON DEFICIENCY ANEMIA TYPE: Primary | ICD-10-CM

## 2023-12-21 PROCEDURE — 6360000002 HC RX W HCPCS

## 2023-12-21 PROCEDURE — 96366 THER/PROPH/DIAG IV INF ADDON: CPT

## 2023-12-21 PROCEDURE — 96365 THER/PROPH/DIAG IV INF INIT: CPT

## 2023-12-21 PROCEDURE — 2580000003 HC RX 258

## 2023-12-21 RX ORDER — EPINEPHRINE 1 MG/ML
0.3 INJECTION, SOLUTION INTRAMUSCULAR; SUBCUTANEOUS PRN
OUTPATIENT
Start: 2023-12-28

## 2023-12-21 RX ORDER — ONDANSETRON 2 MG/ML
8 INJECTION INTRAMUSCULAR; INTRAVENOUS
OUTPATIENT
Start: 2023-12-28

## 2023-12-21 RX ORDER — ONDANSETRON 2 MG/ML
8 INJECTION INTRAMUSCULAR; INTRAVENOUS
Status: DISCONTINUED | OUTPATIENT
Start: 2023-12-21 | End: 2023-12-22 | Stop reason: HOSPADM

## 2023-12-21 RX ORDER — SODIUM CHLORIDE 9 MG/ML
5-250 INJECTION, SOLUTION INTRAVENOUS PRN
Status: DISCONTINUED | OUTPATIENT
Start: 2023-12-21 | End: 2023-12-22 | Stop reason: HOSPADM

## 2023-12-21 RX ORDER — SODIUM CHLORIDE 9 MG/ML
INJECTION, SOLUTION INTRAVENOUS CONTINUOUS
OUTPATIENT
Start: 2023-12-28

## 2023-12-21 RX ORDER — EPINEPHRINE 1 MG/ML
0.3 INJECTION, SOLUTION INTRAMUSCULAR; SUBCUTANEOUS PRN
Status: DISCONTINUED | OUTPATIENT
Start: 2023-12-21 | End: 2023-12-22 | Stop reason: HOSPADM

## 2023-12-21 RX ORDER — ACETAMINOPHEN 325 MG/1
650 TABLET ORAL
OUTPATIENT
Start: 2023-12-28

## 2023-12-21 RX ORDER — ACETAMINOPHEN 325 MG/1
650 TABLET ORAL
Status: DISCONTINUED | OUTPATIENT
Start: 2023-12-21 | End: 2023-12-22 | Stop reason: HOSPADM

## 2023-12-21 RX ORDER — ALBUTEROL SULFATE 90 UG/1
4 AEROSOL, METERED RESPIRATORY (INHALATION) PRN
Status: DISCONTINUED | OUTPATIENT
Start: 2023-12-21 | End: 2023-12-22 | Stop reason: HOSPADM

## 2023-12-21 RX ORDER — HEPARIN 100 UNIT/ML
500 SYRINGE INTRAVENOUS PRN
OUTPATIENT
Start: 2023-12-28

## 2023-12-21 RX ORDER — DIPHENHYDRAMINE HYDROCHLORIDE 50 MG/ML
50 INJECTION INTRAMUSCULAR; INTRAVENOUS
OUTPATIENT
Start: 2023-12-28

## 2023-12-21 RX ORDER — SODIUM CHLORIDE 0.9 % (FLUSH) 0.9 %
5-40 SYRINGE (ML) INJECTION PRN
OUTPATIENT
Start: 2023-12-28

## 2023-12-21 RX ORDER — DIPHENHYDRAMINE HYDROCHLORIDE 50 MG/ML
50 INJECTION INTRAMUSCULAR; INTRAVENOUS
Status: DISCONTINUED | OUTPATIENT
Start: 2023-12-21 | End: 2023-12-22 | Stop reason: HOSPADM

## 2023-12-21 RX ORDER — SODIUM CHLORIDE 9 MG/ML
5-250 INJECTION, SOLUTION INTRAVENOUS PRN
OUTPATIENT
Start: 2023-12-28

## 2023-12-21 RX ORDER — ALBUTEROL SULFATE 90 UG/1
4 AEROSOL, METERED RESPIRATORY (INHALATION) PRN
OUTPATIENT
Start: 2023-12-28

## 2023-12-21 RX ORDER — SODIUM CHLORIDE 0.9 % (FLUSH) 0.9 %
5-40 SYRINGE (ML) INJECTION PRN
Status: DISCONTINUED | OUTPATIENT
Start: 2023-12-21 | End: 2023-12-22 | Stop reason: HOSPADM

## 2023-12-21 RX ORDER — SODIUM CHLORIDE 9 MG/ML
5-250 INJECTION, SOLUTION INTRAVENOUS PRN
Status: CANCELLED | OUTPATIENT
Start: 2023-12-28

## 2023-12-21 RX ORDER — SODIUM CHLORIDE 9 MG/ML
INJECTION, SOLUTION INTRAVENOUS CONTINUOUS
Status: DISCONTINUED | OUTPATIENT
Start: 2023-12-21 | End: 2023-12-22 | Stop reason: HOSPADM

## 2023-12-21 RX ADMIN — SODIUM CHLORIDE 25 ML/HR: 9 INJECTION, SOLUTION INTRAVENOUS at 09:05

## 2023-12-21 RX ADMIN — SODIUM CHLORIDE 300 MG: 9 INJECTION, SOLUTION INTRAVENOUS at 09:29

## 2023-12-28 ENCOUNTER — HOSPITAL ENCOUNTER (OUTPATIENT)
Facility: HOSPITAL | Age: 48
Setting detail: INFUSION SERIES
Discharge: HOME | End: 2023-12-28
Payer: MEDICAID

## 2023-12-28 VITALS
DIASTOLIC BLOOD PRESSURE: 77 MMHG | HEART RATE: 71 BPM | RESPIRATION RATE: 18 BRPM | TEMPERATURE: 97.8 F | SYSTOLIC BLOOD PRESSURE: 140 MMHG

## 2023-12-28 DIAGNOSIS — D50.9 IRON DEFICIENCY ANEMIA, UNSPECIFIED IRON DEFICIENCY ANEMIA TYPE: Primary | ICD-10-CM

## 2023-12-28 PROCEDURE — 96365 THER/PROPH/DIAG IV INF INIT: CPT

## 2023-12-28 PROCEDURE — 2580000003 HC RX 258

## 2023-12-28 PROCEDURE — 96366 THER/PROPH/DIAG IV INF ADDON: CPT

## 2023-12-28 PROCEDURE — 6360000002 HC RX W HCPCS

## 2023-12-28 RX ORDER — SODIUM CHLORIDE 9 MG/ML
5-250 INJECTION, SOLUTION INTRAVENOUS PRN
Status: DISCONTINUED | OUTPATIENT
Start: 2023-12-28 | End: 2023-12-29 | Stop reason: HOSPADM

## 2023-12-28 RX ORDER — DIPHENHYDRAMINE HYDROCHLORIDE 50 MG/ML
50 INJECTION INTRAMUSCULAR; INTRAVENOUS
OUTPATIENT
Start: 2024-01-04

## 2023-12-28 RX ORDER — ACETAMINOPHEN 325 MG/1
650 TABLET ORAL
OUTPATIENT
Start: 2024-01-04

## 2023-12-28 RX ORDER — EPINEPHRINE 1 MG/ML
0.3 INJECTION, SOLUTION INTRAMUSCULAR; SUBCUTANEOUS PRN
OUTPATIENT
Start: 2024-01-04

## 2023-12-28 RX ORDER — ALBUTEROL SULFATE 90 UG/1
4 AEROSOL, METERED RESPIRATORY (INHALATION) PRN
OUTPATIENT
Start: 2024-01-04

## 2023-12-28 RX ORDER — SODIUM CHLORIDE 9 MG/ML
5-250 INJECTION, SOLUTION INTRAVENOUS PRN
OUTPATIENT
Start: 2024-01-04

## 2023-12-28 RX ORDER — SODIUM CHLORIDE 9 MG/ML
5-250 INJECTION, SOLUTION INTRAVENOUS PRN
Status: CANCELLED | OUTPATIENT
Start: 2024-01-04

## 2023-12-28 RX ORDER — SODIUM CHLORIDE 0.9 % (FLUSH) 0.9 %
5-40 SYRINGE (ML) INJECTION PRN
OUTPATIENT
Start: 2024-01-04

## 2023-12-28 RX ORDER — ONDANSETRON 2 MG/ML
8 INJECTION INTRAMUSCULAR; INTRAVENOUS
OUTPATIENT
Start: 2024-01-04

## 2023-12-28 RX ORDER — HEPARIN 100 UNIT/ML
500 SYRINGE INTRAVENOUS PRN
OUTPATIENT
Start: 2024-01-04

## 2023-12-28 RX ORDER — SODIUM CHLORIDE 9 MG/ML
INJECTION, SOLUTION INTRAVENOUS CONTINUOUS
OUTPATIENT
Start: 2024-01-04

## 2023-12-28 RX ADMIN — SODIUM CHLORIDE 300 MG: 9 INJECTION, SOLUTION INTRAVENOUS at 09:52

## 2023-12-28 RX ADMIN — SODIUM CHLORIDE 50 ML/HR: 9 INJECTION, SOLUTION INTRAVENOUS at 09:51

## 2023-12-28 ASSESSMENT — PAIN DESCRIPTION - LOCATION: LOCATION: ABDOMEN

## 2023-12-28 ASSESSMENT — PAIN SCALES - GENERAL: PAINLEVEL_OUTOF10: 3

## 2024-01-04 ENCOUNTER — HOSPITAL ENCOUNTER (OUTPATIENT)
Facility: HOSPITAL | Age: 49
Setting detail: INFUSION SERIES
Discharge: HOME | End: 2024-01-04
Payer: MEDICAID

## 2024-01-04 VITALS — HEART RATE: 70 BPM | DIASTOLIC BLOOD PRESSURE: 74 MMHG | SYSTOLIC BLOOD PRESSURE: 117 MMHG | TEMPERATURE: 98.1 F

## 2024-01-04 DIAGNOSIS — D50.9 IRON DEFICIENCY ANEMIA, UNSPECIFIED IRON DEFICIENCY ANEMIA TYPE: Primary | ICD-10-CM

## 2024-01-04 PROCEDURE — 96366 THER/PROPH/DIAG IV INF ADDON: CPT

## 2024-01-04 PROCEDURE — 6360000002 HC RX W HCPCS

## 2024-01-04 PROCEDURE — 2580000003 HC RX 258

## 2024-01-04 PROCEDURE — 96365 THER/PROPH/DIAG IV INF INIT: CPT

## 2024-01-04 RX ORDER — EPINEPHRINE 1 MG/ML
0.3 INJECTION, SOLUTION INTRAMUSCULAR; SUBCUTANEOUS PRN
OUTPATIENT
Start: 2024-01-11

## 2024-01-04 RX ORDER — ONDANSETRON 2 MG/ML
8 INJECTION INTRAMUSCULAR; INTRAVENOUS
OUTPATIENT
Start: 2024-01-11

## 2024-01-04 RX ORDER — ALBUTEROL SULFATE 90 UG/1
4 AEROSOL, METERED RESPIRATORY (INHALATION) PRN
OUTPATIENT
Start: 2024-01-11

## 2024-01-04 RX ORDER — SODIUM CHLORIDE 9 MG/ML
5-250 INJECTION, SOLUTION INTRAVENOUS PRN
OUTPATIENT
Start: 2024-01-11

## 2024-01-04 RX ORDER — SODIUM CHLORIDE 9 MG/ML
5-250 INJECTION, SOLUTION INTRAVENOUS PRN
Status: DISCONTINUED | OUTPATIENT
Start: 2024-01-04 | End: 2024-01-05 | Stop reason: HOSPADM

## 2024-01-04 RX ORDER — SODIUM CHLORIDE 9 MG/ML
INJECTION, SOLUTION INTRAVENOUS CONTINUOUS
OUTPATIENT
Start: 2024-01-11

## 2024-01-04 RX ORDER — ACETAMINOPHEN 325 MG/1
650 TABLET ORAL
OUTPATIENT
Start: 2024-01-11

## 2024-01-04 RX ORDER — SODIUM CHLORIDE 0.9 % (FLUSH) 0.9 %
5-40 SYRINGE (ML) INJECTION PRN
OUTPATIENT
Start: 2024-01-11

## 2024-01-04 RX ORDER — HEPARIN 100 UNIT/ML
500 SYRINGE INTRAVENOUS PRN
OUTPATIENT
Start: 2024-01-11

## 2024-01-04 RX ORDER — DIPHENHYDRAMINE HYDROCHLORIDE 50 MG/ML
50 INJECTION INTRAMUSCULAR; INTRAVENOUS
OUTPATIENT
Start: 2024-01-11

## 2024-01-04 RX ADMIN — SODIUM CHLORIDE 400 MG: 9 INJECTION, SOLUTION INTRAVENOUS at 11:04

## 2024-01-04 ASSESSMENT — PAIN SCALES - GENERAL: PAINLEVEL_OUTOF10: 0

## 2024-01-04 NOTE — PROGRESS NOTES
[facility] Short Note                       Date: 2024    Name: Dar Hilton    MRN: [id number]         : 1975      1000 Pt admit to [facility] for Venofer ambulatory in stable condition. Assessment completed. No new concerns voiced.  PIV established L AC pos blood return noted.      Mr. Hilton's vitals were reviewed prior to and after treatment.   Patient Vitals for the past 12 hrs:   Temp Pulse BP   24 1300 -- -- 117/74   24 1000 98.1 °F (36.7 °C) 70 139/81         Medications given:   Medications Administered         iron sucrose (VENOFER) 400 mg in sodium chloride 0.9 % 250 mL IVPB Admin Date  2024 Action  New Bag Dose  400 mg Rate  118 mL/hr Route  IntraVENous Administered By  Sonia Cook RN          PIV removed    Mr. Hilton tolerated the infusion, and had no complaints.    [facility]. Pt aware of next appt    Future Appointments   Date Time Provider Department [facility]   [facility]   3/15/2024  9:30 AM Arlette Tinoco MD [facility] BS AMB       Sonia Cook RN  2024  1:07 PM

## 2024-01-09 ENCOUNTER — ANESTHESIA (OUTPATIENT)
Facility: HOSPITAL | Age: 49
End: 2024-01-09
Payer: MEDICAID

## 2024-01-09 ENCOUNTER — HOSPITAL ENCOUNTER (OUTPATIENT)
Facility: HOSPITAL | Age: 49
Setting detail: OUTPATIENT SURGERY
Discharge: HOME | End: 2024-01-09
Attending: INTERNAL MEDICINE | Admitting: INTERNAL MEDICINE
Payer: MEDICAID

## 2024-01-09 ENCOUNTER — ANESTHESIA EVENT (OUTPATIENT)
Facility: HOSPITAL | Age: 49
End: 2024-01-09
Payer: MEDICAID

## 2024-01-09 VITALS
HEIGHT: 71 IN | OXYGEN SATURATION: 95 % | DIASTOLIC BLOOD PRESSURE: 104 MMHG | HEART RATE: 92 BPM | BODY MASS INDEX: 36.4 KG/M2 | TEMPERATURE: 97.9 F | RESPIRATION RATE: 18 BRPM | WEIGHT: 260 LBS | SYSTOLIC BLOOD PRESSURE: 170 MMHG

## 2024-01-09 PROCEDURE — 2709999900 HC NON-CHARGEABLE SUPPLY: Performed by: INTERNAL MEDICINE

## 2024-01-09 PROCEDURE — 2500000003 HC RX 250 WO HCPCS: Performed by: ANESTHESIOLOGY

## 2024-01-09 PROCEDURE — 2720000010 HC SURG SUPPLY STERILE: Performed by: INTERNAL MEDICINE

## 2024-01-09 PROCEDURE — 3600007502: Performed by: INTERNAL MEDICINE

## 2024-01-09 PROCEDURE — 3700000000 HC ANESTHESIA ATTENDED CARE: Performed by: INTERNAL MEDICINE

## 2024-01-09 PROCEDURE — 7100000010 HC PHASE II RECOVERY - FIRST 15 MIN: Performed by: INTERNAL MEDICINE

## 2024-01-09 PROCEDURE — 3600007512: Performed by: INTERNAL MEDICINE

## 2024-01-09 PROCEDURE — 3700000001 HC ADD 15 MINUTES (ANESTHESIA): Performed by: INTERNAL MEDICINE

## 2024-01-09 PROCEDURE — 2580000003 HC RX 258: Performed by: INTERNAL MEDICINE

## 2024-01-09 PROCEDURE — 88305 TISSUE EXAM BY PATHOLOGIST: CPT

## 2024-01-09 PROCEDURE — 6360000002 HC RX W HCPCS: Performed by: ANESTHESIOLOGY

## 2024-01-09 RX ORDER — KETAMINE HYDROCHLORIDE 100 MG/ML
INJECTION, SOLUTION INTRAMUSCULAR; INTRAVENOUS PRN
Status: DISCONTINUED | OUTPATIENT
Start: 2024-01-09 | End: 2024-01-09 | Stop reason: SDUPTHER

## 2024-01-09 RX ORDER — LIDOCAINE HYDROCHLORIDE 20 MG/ML
INJECTION, SOLUTION EPIDURAL; INFILTRATION; INTRACAUDAL; PERINEURAL PRN
Status: DISCONTINUED | OUTPATIENT
Start: 2024-01-09 | End: 2024-01-09 | Stop reason: SDUPTHER

## 2024-01-09 RX ORDER — SODIUM CHLORIDE 9 MG/ML
25 INJECTION, SOLUTION INTRAVENOUS PRN
Status: DISCONTINUED | OUTPATIENT
Start: 2024-01-09 | End: 2024-01-09 | Stop reason: HOSPADM

## 2024-01-09 RX ORDER — SODIUM CHLORIDE 0.9 % (FLUSH) 0.9 %
5-40 SYRINGE (ML) INJECTION EVERY 12 HOURS SCHEDULED
Status: DISCONTINUED | OUTPATIENT
Start: 2024-01-09 | End: 2024-01-09 | Stop reason: HOSPADM

## 2024-01-09 RX ORDER — SODIUM CHLORIDE 0.9 % (FLUSH) 0.9 %
5-40 SYRINGE (ML) INJECTION PRN
Status: DISCONTINUED | OUTPATIENT
Start: 2024-01-09 | End: 2024-01-09 | Stop reason: HOSPADM

## 2024-01-09 RX ADMIN — PROPOFOL 600 MG: 10 INJECTION, EMULSION INTRAVENOUS at 14:07

## 2024-01-09 RX ADMIN — LIDOCAINE HYDROCHLORIDE 100 MG: 20 INJECTION, SOLUTION EPIDURAL; INFILTRATION; INTRACAUDAL; PERINEURAL at 13:41

## 2024-01-09 RX ADMIN — SODIUM CHLORIDE 25 ML: 9 INJECTION, SOLUTION INTRAVENOUS at 13:09

## 2024-01-09 RX ADMIN — KETAMINE HYDROCHLORIDE 50 MG: 100 INJECTION, SOLUTION, CONCENTRATE INTRAMUSCULAR; INTRAVENOUS at 14:06

## 2024-01-09 ASSESSMENT — PAIN - FUNCTIONAL ASSESSMENT
PAIN_FUNCTIONAL_ASSESSMENT: 0-10
PAIN_FUNCTIONAL_ASSESSMENT: NONE - DENIES PAIN

## 2024-01-09 NOTE — OP NOTE
[facility]                  Colonoscopy Operative Report    1/9/2024      Dar ARTHUR Hilton  [id number]  1975    Procedure Type:   Colonoscopy --diagnostic     Indications:    Iron deficiency anemia, Lower rectal bleeding     Pre-operative Diagnosis: see indication above    Post-operative Diagnosis:  See findings below    :  GÉNESIS Cheatham Jr, MD    Referring Provider: Stephen Melendez MD      Sedation:  MAC anesthesia Propofol    Pre-Procedural Exam:      Airway: clear,  No airway problems anticipated  Heart: RRR, without gallops or rubs  Lungs: clear bilaterally without wheezes, crackles, or rhonchi  Abdomen: soft, nontender, nondistended, bowel sounds present  Mental Status: awake, alert and oriented to person, place and time     Procedure Details:  After informed consent was obtained with all risks and benefits of procedure explained and preoperative exam completed, the patient was taken to the endoscopy suite and placed in the left lateral decubitus position.  Upon sequential sedation as per above, a digital rectal exam was performed .  The Olympus videocolonoscope  was inserted in the rectum and carefully advanced to the cecum, which was identified by the ileocecal valve and appendiceal orifice.  The cecum was identified by the ileocecal valve and appendiceal orifice.  The quality of preparation was adequate.  The colonoscope was slowly withdrawn with careful evaluation between folds. Retroflexion in the rectum was completed demonstrating internal hemorrhoids.     Findings:   Rectum: Grade 2 internal hemorrhoid(s);  Sigmoid: normal  Descending Colon: normal  Transverse Colon: normal  Ascending Colon: normal  Cecum: normal  Terminal Ileum: not intubated      Specimen Removed:  none    Complications: None.     EBL:  None.    Impression:    normal colonic mucosa throughout  hemorrhoids internal, Large in size    Recommendations: --For colon cancer screening in this

## 2024-01-09 NOTE — PROGRESS NOTES
TRANSFER - IN REPORT:    Verbal report received from Rina Hilton  being received from sade for routine progression of patient care      Report consisted of patient's Situation, Background, Assessment and   Recommendations(SBAR).     Information from the following report(s) Nurse Handoff Report was reviewed with the receiving nurse.    Opportunity for questions and clarification was provided.      Assessment completed upon patient's arrival to unit and care assumed.

## 2024-01-09 NOTE — ANESTHESIA POSTPROCEDURE EVALUATION
Department of Anesthesiology  Postprocedure Note    Patient: Dar Hilton  MRN: [id number]  YOB: 1975  Date of evaluation: 1/9/2024    Procedure Summary       Date: 01/09/24 Room / Location: [facility] ENDO 01 / [facility] ENDOSCOPY    Anesthesia Start: 1334 Anesthesia Stop: 1410    Procedures:       COLONOSCOPY WITH BIOPSY, POLYPECTOMY, EGD WITH BIOPSY (Lower GI Region)      EGD BIOPSY (Upper GI Region) Diagnosis:       Iron deficiency anemia, unspecified iron deficiency anemia type      Hematochezia      Outlet dysfunction constipation      Hemorrhoids, internal, with bleeding      (Iron deficiency anemia, unspecified iron deficiency anemia type [D50.9])      (Hematochezia [K92.1])      (Outlet dysfunction constipation [K59.02])      (Hemorrhoids, internal, with bleeding [K64.8])    Surgeons: Ke Cheatham Jr., MD Responsible Provider: Shaina Guo DO    Anesthesia Type: TIVA ASA Status: 3            Anesthesia Type: TIVA    Dafne Phase I: Dafne Score: 10    Dafne Phase II: Dafne Score: 10    Anesthesia Post Evaluation    Patient location during evaluation: PACU  Patient participation: complete - patient participated  Level of consciousness: awake  Airway patency: patent  Nausea & Vomiting: no vomiting and no nausea  Cardiovascular status: hemodynamically stable  Respiratory status: acceptable  Hydration status: euvolemic    No notable events documented.

## 2024-01-09 NOTE — ANESTHESIA PRE PROCEDURE
Department of Anesthesiology  Preprocedure Note       Name:  Dar Hilton   Age:  48 y.o.  :  1975                                          MRN:  [id number]         Date:  2024      Surgeon: Surgeon(s):  Ke Cheatham Jr., MD    Procedure: Procedure(s):  COLONOSCOPY WITH BIOPSY, POLYPECTOMY, EGD WITH BIOPSY  EGD BIOPSY    Medications prior to admission:   Prior to Admission medications    Medication Sig Start Date End Date Taking? Authorizing Provider   [employer] PO Take by mouth   Yes Provider, MD Alejandra   gabapentin (NEURONTIN) 300 MG capsule Take 1 capsule by mouth 3 times daily. Indications: Crushed disc in back per pt    ProviderAlejandra MD   albuterol (PROVENTIL) (2.5 MG/3ML) 0.083% nebulizer solution Inhale 3 mLs into the lungs every 4 hours as needed 18   Automatic Reconciliation, Ar   albuterol sulfate HFA (PROVENTIL;VENTOLIN;PROAIR) 108 (90 Base) MCG/ACT inhaler Inhale 2 puffs into the lungs every 4 hours as needed 10/19/17   Automatic Reconciliation, Ar   cyclobenzaprine (FLEXERIL) 10 MG tablet TAKE 1 TABLET BY MOUTH THREE TIMES DAILY AS NEEDED FOR MUSCLE SPASM 18   Automatic Reconciliation, Ar   fluticasone-salmeterol (ADVAIR DISKUS) 250-50 MCG/ACT AEPB diskus inhaler Inhale 1 puff into the lungs 2 times daily 18   Automatic Reconciliation, Ar   hydroCHLOROthiazide (HYDRODIURIL) 25 MG tablet TAKE 1 TABLET BY MOUTH ONCE DAILY FOR BLOOD PRESSURE/SWELLING 3/11/19   Automatic Reconciliation, Ar   lisinopril (PRINIVIL;ZESTRIL) 5 MG tablet Take 1 tablet by mouth daily 18   Automatic Reconciliation, Ar   mometasone (NASONEX) 50 MCG/ACT nasal spray 2 sprays by Nasal route daily 18   Automatic Reconciliation, Ar   montelukast (SINGULAIR) 10 MG tablet take 1 tablet by mouth once daily 18   Automatic Reconciliation, Ar   rosuvastatin (CRESTOR) 10 MG tablet Take 1 tablet by mouth daily    Automatic Reconciliation, Ar   tiotropium

## 2024-01-09 NOTE — OP NOTE
[facility]                   Endoscopic Gastroduodenoscopy Procedure Note      1/9/2024  Dar ARTHUR Hilton  1975  [id number]    Procedure: Endoscopic Gastroduodenoscopy with biopsy    Indication: Iron deficiency anemia     Pre-operative Diagnosis: see indication above    Post-operative Diagnosis: see findings below    : VELMA Cheatham Jr. MD    Referring Provider:  Stephen Melendez MD      Anesthesia/Sedation:  MAC anesthesia Propofol    Airway assessment: No airway problems anticipated    Pre-Procedural Exam:      Airway: clear, no airway problems anticipated  Heart: RRR, without gallops or rubs  Lungs: clear bilaterally without wheezes, crackles, or rhonchi  Abdomen: soft, nontender, nondistended, bowel sounds present  Mental Status: awake, alert and oriented to person, place and time       Procedure Details     After infomed consent was obtained for the procedure, with all risks and benefits of procedure explained the patient was taken to the endoscopy suite and placed in the left lateral decubitus position.  Following sequential administration of sedation as per above, the endoscope was inserted into the mouth and advanced under direct vision to second portion of the duodenum.  A careful inspection was made as the gastroscope was withdrawn, including a retroflexed view of the proximal stomach; findings and interventions are described below.      Findings:   Esophagus: normal mucosa  Stomach: Mild antritis, biopsied  Duodenum: normal, biopsied    Therapies:  biopsy of stomach antrum  biopsy of duodenal second portion    Specimens:  1. Duodenal biopsies  2 Antral biopsies           Complications:   None; patient tolerated the procedure well.    EBL:  None.            Impression:      Esophagus normal  Mild antritis  Normal duodenum    Recommendations:  -Await pathology.    GÉNESIS Cheatham MD1/9/2024

## 2024-01-09 NOTE — DISCHARGE INSTRUCTIONS
VELMA Cheatham MD  [facility]  [address], [address]  [place], [place] [address] [phone]  [email or web address]    Dar Hilton  [id number]  1975    COLON DISCHARGE INSTRUCTIONS  Discomfort:  Redness at IV site- apply warm compress to area; if redness or soreness persist- contact your physician  There may be a slight amount of blood passed from the rectum  Gaseous discomfort- walking, belching will help relieve any discomfort  You may not operate a vehicle for 12 hours  You may not engage in an occupation involving machinery or appliances for rest of today  You may not drink alcoholic beverages for at least 12 hours  Avoid making any critical decisions for at least 24 hour  DIET:   High fiber diet.   - however -  remember your colon is empty and a heavy meal will produce gas.   Avoid these foods:  vegetables, fried / greasy foods, carbonated drinks for today      ACTIVITY:  You may resume your normal daily activities it is recommended that you spend the remainder of the day resting -  avoid any strenuous activity.    CALL M.D.  ANY SIGN OF:   Increasing pain, nausea, vomiting  Abdominal distension (swelling)  New increased bleeding (oral or rectal)  Fever (chills)  Pain in chest area  Bloody discharge from nose or mouth  Shortness of breath     COLONOSCOPY FINDINGS:  Your colonoscopy showed: large hemorrhoids.    Follow-up Instructions:   Call Dr. GÉNESIS Cheatham if any questions or problems.   Telephone # [phone]  Should have a repeat colonoscopy in 10 years.      EGD DISCHARGE INSTRUCTIONS    Discomfort:  Sore throat- throat lozenges or warm salt water gargle  redness at IV site- apply warm compress to area; if redness or soreness persist- contact your physician  Gaseous discomfort- walking, belching will help relieve any discomfort  You may not operate a vehicle for 12 hours  You may not engage in an occupation involving machinery or appliances

## 2024-01-09 NOTE — PROGRESS NOTES
ARRIVAL INFORMATION:  Verified patient name and date of birth, scheduled procedure, and informed consent.     : Edgar (wife) contact number: [phone]  Physician and staff can share information with the .     Belongings with patient include:  Clothing,Dentures lower, wallet, cell phone    GI FOCUSED ASSESSMENT:  Neuro: Awake, alert, oriented x4  Respiratory: even and unlabored   GI: soft and non-distended  EKG Rhythm: sinus tachycardia    Education:Reviewed general discharge instructions and  information.  The risks and benefits of the bite block have been explained to patient.  Patient verbalizes understanding.

## 2024-01-11 ENCOUNTER — HOSPITAL ENCOUNTER (OUTPATIENT)
Facility: HOSPITAL | Age: 49
Setting detail: INFUSION SERIES
Discharge: HOME | End: 2024-01-11
Payer: MEDICAID

## 2024-01-11 VITALS
HEART RATE: 72 BPM | TEMPERATURE: 97.6 F | WEIGHT: 260 LBS | DIASTOLIC BLOOD PRESSURE: 77 MMHG | BODY MASS INDEX: 36.26 KG/M2 | RESPIRATION RATE: 18 BRPM | SYSTOLIC BLOOD PRESSURE: 124 MMHG

## 2024-01-11 DIAGNOSIS — D50.9 IRON DEFICIENCY ANEMIA, UNSPECIFIED IRON DEFICIENCY ANEMIA TYPE: Primary | ICD-10-CM

## 2024-01-11 PROCEDURE — 96365 THER/PROPH/DIAG IV INF INIT: CPT

## 2024-01-11 PROCEDURE — 6360000002 HC RX W HCPCS

## 2024-01-11 PROCEDURE — 96366 THER/PROPH/DIAG IV INF ADDON: CPT

## 2024-01-11 PROCEDURE — 2580000003 HC RX 258

## 2024-01-11 RX ORDER — SODIUM CHLORIDE 0.9 % (FLUSH) 0.9 %
5-40 SYRINGE (ML) INJECTION PRN
OUTPATIENT
Start: 2024-01-11

## 2024-01-11 RX ORDER — SODIUM CHLORIDE 9 MG/ML
INJECTION, SOLUTION INTRAVENOUS CONTINUOUS
OUTPATIENT
Start: 2024-01-11

## 2024-01-11 RX ORDER — DIPHENHYDRAMINE HYDROCHLORIDE 50 MG/ML
50 INJECTION INTRAMUSCULAR; INTRAVENOUS
OUTPATIENT
Start: 2024-01-11

## 2024-01-11 RX ORDER — ALBUTEROL SULFATE 90 UG/1
4 AEROSOL, METERED RESPIRATORY (INHALATION) PRN
OUTPATIENT
Start: 2024-01-11

## 2024-01-11 RX ORDER — HEPARIN 100 UNIT/ML
500 SYRINGE INTRAVENOUS PRN
OUTPATIENT
Start: 2024-01-11

## 2024-01-11 RX ORDER — EPINEPHRINE 1 MG/ML
0.3 INJECTION, SOLUTION INTRAMUSCULAR; SUBCUTANEOUS PRN
OUTPATIENT
Start: 2024-01-11

## 2024-01-11 RX ORDER — SODIUM CHLORIDE 9 MG/ML
5-250 INJECTION, SOLUTION INTRAVENOUS PRN
Status: DISCONTINUED | OUTPATIENT
Start: 2024-01-11 | End: 2024-01-12 | Stop reason: HOSPADM

## 2024-01-11 RX ORDER — SODIUM CHLORIDE 0.9 % (FLUSH) 0.9 %
5-40 SYRINGE (ML) INJECTION PRN
Status: DISCONTINUED | OUTPATIENT
Start: 2024-01-11 | End: 2024-01-12 | Stop reason: HOSPADM

## 2024-01-11 RX ORDER — ONDANSETRON 2 MG/ML
8 INJECTION INTRAMUSCULAR; INTRAVENOUS
OUTPATIENT
Start: 2024-01-11

## 2024-01-11 RX ORDER — HEPARIN 100 UNIT/ML
500 SYRINGE INTRAVENOUS PRN
Status: DISCONTINUED | OUTPATIENT
Start: 2024-01-11 | End: 2024-01-12 | Stop reason: HOSPADM

## 2024-01-11 RX ORDER — SODIUM CHLORIDE 9 MG/ML
5-250 INJECTION, SOLUTION INTRAVENOUS PRN
OUTPATIENT
Start: 2024-01-11

## 2024-01-11 RX ORDER — ACETAMINOPHEN 325 MG/1
650 TABLET ORAL
OUTPATIENT
Start: 2024-01-11

## 2024-01-11 RX ADMIN — SODIUM CHLORIDE 400 MG: 9 INJECTION, SOLUTION INTRAVENOUS at 10:23

## 2024-01-11 RX ADMIN — SODIUM CHLORIDE 25 ML/HR: 9 INJECTION, SOLUTION INTRAVENOUS at 10:21

## 2024-01-11 ASSESSMENT — PAIN SCALES - GENERAL: PAINLEVEL_OUTOF10: 0

## 2024-01-11 NOTE — PROGRESS NOTES
[facility] Short Note                       Date: 2024    Name: Dar Hilton    MRN: [id number]         : 1975      0930 Pt admit to [facility] for VENOFER ambulatory in stable condition. Assessment completed. No new concerns voiced.    Mr. Hilton's vitals were reviewed prior to and after treatment.   Patient Vitals for the past 12 hrs:   Temp Pulse Resp BP   24 1308 -- 72 -- 124/77   24 0946 97.6 °F (36.4 °C) 72 18 (!) 158/84           Medications Administered         0.9 % sodium chloride infusion Admin Date  2024 Action  New Bag Dose  25 mL/hr Rate  25 mL/hr Route  IntraVENous Administered By  Maria Esther Mishra RN        iron sucrose (VENOFER) 400 mg in sodium chloride 0.9 % 250 mL IVPB Admin Date  2024 Action  New Bag Dose  400 mg Rate  118 mL/hr Route  IntraVENous Administered By  Maria Esther Mishra RN            Peripheral IV #24 started in left forearm by Magalys ARTHUR. Positive blood return. IV flushed and removed at end of treatment.     Mr. Hilton tolerated the infusion, and had no complaints.    [facility].     Future Appointments   Date Time Provider Department Center   3/15/2024  9:30 AM Arlette Tinoco MD [facility] BS AMB       Maria Esther Mishra RN  2024  1:30 PM

## 2024-01-12 NOTE — PROGRESS NOTES
[facility]  Preoperative Instructions        Surgery Date 1/16/2024   Time of Arrival To Be called  Contact# [phone]    1. On the day of your surgery, please report to the Surgical Services Registration Desk and sign in at your designated time. The Surgery Center is located to the right of the Emergency Room.     2. You must have someone with you to drive you home. You should not drive a car for 24 hours following surgery. Please make arrangements for a friend or family member to stay with you for the first 24 hours after your surgery.    3. Do not have anything to eat or drink (including water, gum, mints, coffee, juice) after midnight ??1/15/2024 .?This may not apply to medications prescribed by your physician. ?(Please note below the special instructions with medications to take the morning of your procedure.)    4. We recommend you do not drink any alcoholic beverages for 24 hours before and after your surgery.    5. Contact your surgeon’s office for instructions on the following medications: non-steroidal anti-inflammatory drugs (i.e. Advil, Aleve), vitamins, and supplements. (Some surgeon’s will want you to stop these medications prior to surgery and others may allow you to take them)  **If you are currently taking Plavix, Coumadin, Aspirin and/or other blood-thinning agents, contact your surgeon for instructions.** Your surgeon will partner with the physician prescribing these medications to determine if it is safe to stop or if you need to continue taking.  Please do not stop taking these medications without instructions from your surgeon    6. Wear comfortable clothes.  Wear glasses instead of contacts.  Do not bring any money or jewelry. Please bring picture ID, insurance card, and any prearranged co-payment or hospital payment.  Do not wear make-up, particularly mascara the morning of your surgery.  Do not wear nail polish, particularly if you are having foot /hand

## 2024-01-15 ENCOUNTER — ANESTHESIA EVENT (OUTPATIENT)
Facility: HOSPITAL | Age: 49
End: 2024-01-15
Payer: MEDICAID

## 2024-01-15 NOTE — ANESTHESIA PRE PROCEDURE
Department of Anesthesiology  Preprocedure Note       Name:  Dar Hilton   Age:  48 y.o.  :  1975                                          MRN:  [id number]         Date:  1/15/2024      Surgeon: Surgeon(s):  Jeffery Schwartz II, MD    Procedure: Procedure(s):  TRANSANAL HEMORRHOID DEARTERIALIZATION    Medications prior to admission:   Prior to Admission medications    Medication Sig Start Date End Date Taking? Authorizing Provider   [employer] PO Take by mouth    ProviderAlejandra MD   gabapentin (NEURONTIN) 300 MG capsule Take 1 capsule by mouth 3 times daily. Indications: Crushed disc in back per pt    Provider, MD Alejandra   albuterol (PROVENTIL) (2.5 MG/3ML) 0.083% nebulizer solution Inhale 3 mLs into the lungs every 4 hours as needed 18   Automatic Reconciliation, Ar   albuterol sulfate HFA (PROVENTIL;VENTOLIN;PROAIR) 108 (90 Base) MCG/ACT inhaler Inhale 2 puffs into the lungs every 4 hours as needed 10/19/17   Automatic Reconciliation, Ar   cyclobenzaprine (FLEXERIL) 10 MG tablet TAKE 1 TABLET BY MOUTH THREE TIMES DAILY AS NEEDED FOR MUSCLE SPASM 18   Automatic Reconciliation, Ar   fluticasone-salmeterol (ADVAIR DISKUS) 250-50 MCG/ACT AEPB diskus inhaler Inhale 1 puff into the lungs 2 times daily 18   Automatic Reconciliation, Ar   hydroCHLOROthiazide (HYDRODIURIL) 25 MG tablet TAKE 1 TABLET BY MOUTH ONCE DAILY FOR BLOOD PRESSURE/SWELLING 3/11/19   Automatic Reconciliation, Ar   lisinopril (PRINIVIL;ZESTRIL) 5 MG tablet Take 1 tablet by mouth daily 18   Automatic Reconciliation, Ar   mometasone (NASONEX) 50 MCG/ACT nasal spray 2 sprays by Nasal route daily 18   Automatic Reconciliation, Ar   montelukast (SINGULAIR) 10 MG tablet take 1 tablet by mouth once daily 18   Automatic Reconciliation, Ar   rosuvastatin (CRESTOR) 10 MG tablet Take 1 tablet by mouth daily    Automatic Reconciliation, Ar   tiotropium (SPIRIVA) 18 MCG inhalation capsule Inhale

## 2024-01-16 ENCOUNTER — HOSPITAL ENCOUNTER (OUTPATIENT)
Facility: HOSPITAL | Age: 49
Setting detail: OUTPATIENT SURGERY
Discharge: HOME | End: 2024-01-16
Attending: SURGERY | Admitting: SURGERY
Payer: MEDICAID

## 2024-01-16 ENCOUNTER — ANESTHESIA (OUTPATIENT)
Facility: HOSPITAL | Age: 49
End: 2024-01-16
Payer: MEDICAID

## 2024-01-16 VITALS
DIASTOLIC BLOOD PRESSURE: 78 MMHG | BODY MASS INDEX: 35 KG/M2 | RESPIRATION RATE: 12 BRPM | HEIGHT: 71 IN | OXYGEN SATURATION: 98 % | TEMPERATURE: 98.3 F | HEART RATE: 81 BPM | WEIGHT: 250 LBS | SYSTOLIC BLOOD PRESSURE: 120 MMHG

## 2024-01-16 DIAGNOSIS — G89.18 POST-OP PAIN: Primary | ICD-10-CM

## 2024-01-16 PROCEDURE — 6360000002 HC RX W HCPCS: Performed by: ANESTHESIOLOGY

## 2024-01-16 PROCEDURE — 7100000000 HC PACU RECOVERY - FIRST 15 MIN: Performed by: SURGERY

## 2024-01-16 PROCEDURE — 3600000004 HC SURGERY LEVEL 4 BASE: Performed by: SURGERY

## 2024-01-16 PROCEDURE — 3600000014 HC SURGERY LEVEL 4 ADDTL 15MIN: Performed by: SURGERY

## 2024-01-16 PROCEDURE — 2580000003 HC RX 258: Performed by: ANESTHESIOLOGY

## 2024-01-16 PROCEDURE — 2709999900 HC NON-CHARGEABLE SUPPLY: Performed by: SURGERY

## 2024-01-16 PROCEDURE — C9290 INJ, BUPIVACAINE LIPOSOME: HCPCS | Performed by: SURGERY

## 2024-01-16 PROCEDURE — 3700000001 HC ADD 15 MINUTES (ANESTHESIA): Performed by: SURGERY

## 2024-01-16 PROCEDURE — 6360000002 HC RX W HCPCS: Performed by: SURGERY

## 2024-01-16 PROCEDURE — 3700000000 HC ANESTHESIA ATTENDED CARE: Performed by: SURGERY

## 2024-01-16 PROCEDURE — 7100000001 HC PACU RECOVERY - ADDTL 15 MIN: Performed by: SURGERY

## 2024-01-16 PROCEDURE — 2580000003 HC RX 258: Performed by: SURGERY

## 2024-01-16 RX ORDER — SODIUM CHLORIDE 9 MG/ML
INJECTION, SOLUTION INTRAVENOUS PRN
Status: DISCONTINUED | OUTPATIENT
Start: 2024-01-16 | End: 2024-01-16 | Stop reason: HOSPADM

## 2024-01-16 RX ORDER — HYDROMORPHONE HYDROCHLORIDE 1 MG/ML
0.25 INJECTION, SOLUTION INTRAMUSCULAR; INTRAVENOUS; SUBCUTANEOUS EVERY 5 MIN PRN
Status: DISCONTINUED | OUTPATIENT
Start: 2024-01-16 | End: 2024-01-16 | Stop reason: HOSPADM

## 2024-01-16 RX ORDER — SODIUM CHLORIDE 0.9 % (FLUSH) 0.9 %
5-40 SYRINGE (ML) INJECTION EVERY 12 HOURS SCHEDULED
Status: DISCONTINUED | OUTPATIENT
Start: 2024-01-16 | End: 2024-01-16 | Stop reason: HOSPADM

## 2024-01-16 RX ORDER — OXYCODONE HYDROCHLORIDE 5 MG/1
5 TABLET ORAL EVERY 6 HOURS PRN
Qty: 30 TABLET | Refills: 0 | Status: SHIPPED | OUTPATIENT
Start: 2024-01-16 | End: 2024-01-24

## 2024-01-16 RX ORDER — PROCHLORPERAZINE EDISYLATE 5 MG/ML
5 INJECTION INTRAMUSCULAR; INTRAVENOUS
Status: DISCONTINUED | OUTPATIENT
Start: 2024-01-16 | End: 2024-01-16 | Stop reason: HOSPADM

## 2024-01-16 RX ORDER — DEXAMETHASONE SODIUM PHOSPHATE 4 MG/ML
INJECTION, SOLUTION INTRA-ARTICULAR; INTRALESIONAL; INTRAMUSCULAR; INTRAVENOUS; SOFT TISSUE PRN
Status: DISCONTINUED | OUTPATIENT
Start: 2024-01-16 | End: 2024-01-16 | Stop reason: SDUPTHER

## 2024-01-16 RX ORDER — MIDAZOLAM HYDROCHLORIDE 1 MG/ML
INJECTION INTRAMUSCULAR; INTRAVENOUS PRN
Status: DISCONTINUED | OUTPATIENT
Start: 2024-01-16 | End: 2024-01-16 | Stop reason: SDUPTHER

## 2024-01-16 RX ORDER — ONDANSETRON 2 MG/ML
INJECTION INTRAMUSCULAR; INTRAVENOUS PRN
Status: DISCONTINUED | OUTPATIENT
Start: 2024-01-16 | End: 2024-01-16 | Stop reason: SDUPTHER

## 2024-01-16 RX ORDER — SODIUM CHLORIDE, SODIUM LACTATE, POTASSIUM CHLORIDE, CALCIUM CHLORIDE 600; 310; 30; 20 MG/100ML; MG/100ML; MG/100ML; MG/100ML
INJECTION, SOLUTION INTRAVENOUS ONCE
Status: COMPLETED | OUTPATIENT
Start: 2024-01-16 | End: 2024-01-16

## 2024-01-16 RX ORDER — FENTANYL CITRATE 50 UG/ML
INJECTION, SOLUTION INTRAMUSCULAR; INTRAVENOUS PRN
Status: DISCONTINUED | OUTPATIENT
Start: 2024-01-16 | End: 2024-01-16 | Stop reason: SDUPTHER

## 2024-01-16 RX ORDER — SODIUM CHLORIDE, SODIUM LACTATE, POTASSIUM CHLORIDE, CALCIUM CHLORIDE 600; 310; 30; 20 MG/100ML; MG/100ML; MG/100ML; MG/100ML
INJECTION, SOLUTION INTRAVENOUS CONTINUOUS PRN
Status: DISCONTINUED | OUTPATIENT
Start: 2024-01-16 | End: 2024-01-16 | Stop reason: SDUPTHER

## 2024-01-16 RX ORDER — SODIUM CHLORIDE 0.9 % (FLUSH) 0.9 %
5-40 SYRINGE (ML) INJECTION PRN
Status: DISCONTINUED | OUTPATIENT
Start: 2024-01-16 | End: 2024-01-16 | Stop reason: HOSPADM

## 2024-01-16 RX ORDER — FENTANYL CITRATE 50 UG/ML
50 INJECTION, SOLUTION INTRAMUSCULAR; INTRAVENOUS EVERY 5 MIN PRN
Status: DISCONTINUED | OUTPATIENT
Start: 2024-01-16 | End: 2024-01-16 | Stop reason: HOSPADM

## 2024-01-16 RX ORDER — SUCCINYLCHOLINE CHLORIDE 20 MG/ML
INJECTION INTRAMUSCULAR; INTRAVENOUS PRN
Status: DISCONTINUED | OUTPATIENT
Start: 2024-01-16 | End: 2024-01-16 | Stop reason: SDUPTHER

## 2024-01-16 RX ADMIN — SODIUM CHLORIDE, POTASSIUM CHLORIDE, SODIUM LACTATE AND CALCIUM CHLORIDE: 600; 310; 30; 20 INJECTION, SOLUTION INTRAVENOUS at 12:17

## 2024-01-16 RX ADMIN — SUCCINYLCHOLINE CHLORIDE 180 MG: 20 INJECTION, SOLUTION INTRAMUSCULAR; INTRAVENOUS at 12:24

## 2024-01-16 RX ADMIN — FENTANYL CITRATE 50 MCG: 50 INJECTION, SOLUTION INTRAMUSCULAR; INTRAVENOUS at 13:02

## 2024-01-16 RX ADMIN — FENTANYL CITRATE 50 MCG: 50 INJECTION, SOLUTION INTRAMUSCULAR; INTRAVENOUS at 12:50

## 2024-01-16 RX ADMIN — BUPIVACAINE 266 MG: 13.3 INJECTION, SUSPENSION, LIPOSOMAL INFILTRATION at 13:04

## 2024-01-16 RX ADMIN — ONDANSETRON HYDROCHLORIDE 4 MG: 2 INJECTION, SOLUTION INTRAMUSCULAR; INTRAVENOUS at 13:03

## 2024-01-16 RX ADMIN — MIDAZOLAM HYDROCHLORIDE 2 MG: 1 INJECTION, SOLUTION INTRAMUSCULAR; INTRAVENOUS at 12:17

## 2024-01-16 RX ADMIN — PROPOFOL 300 MG: 10 INJECTION, EMULSION INTRAVENOUS at 12:22

## 2024-01-16 RX ADMIN — FENTANYL CITRATE 50 MCG: 50 INJECTION, SOLUTION INTRAMUSCULAR; INTRAVENOUS at 12:39

## 2024-01-16 RX ADMIN — FENTANYL CITRATE 50 MCG: 50 INJECTION, SOLUTION INTRAMUSCULAR; INTRAVENOUS at 12:44

## 2024-01-16 RX ADMIN — SODIUM CHLORIDE, POTASSIUM CHLORIDE, SODIUM LACTATE AND CALCIUM CHLORIDE: 600; 310; 30; 20 INJECTION, SOLUTION INTRAVENOUS at 12:01

## 2024-01-16 RX ADMIN — FENTANYL CITRATE 50 MCG: 50 INJECTION, SOLUTION INTRAMUSCULAR; INTRAVENOUS at 12:22

## 2024-01-16 RX ADMIN — DEXAMETHASONE SODIUM PHOSPHATE 8 MG: 4 INJECTION, SOLUTION INTRAMUSCULAR; INTRAVENOUS at 12:34

## 2024-01-16 ASSESSMENT — PAIN - FUNCTIONAL ASSESSMENT: PAIN_FUNCTIONAL_ASSESSMENT: 0-10

## 2024-01-16 NOTE — ANESTHESIA POSTPROCEDURE EVALUATION
Department of Anesthesiology  Postprocedure Note    Patient: Dar Hilton  MRN: [id number]  YOB: 1975  Date of evaluation: 1/16/2024    Procedure Summary       Date: 01/16/24 Room / Location: [facility] MAIN OR  / [facility] MAIN OR    Anesthesia Start: 1217 Anesthesia Stop: 1325    Procedure: TRANSANAL HEMORRHOID DEARTERIALIZATION (Anus) Diagnosis:       Third degree hemorrhoids      (Third degree hemorrhoids [K64.2])    Providers: Jeffery Schwartz II, MD Responsible Provider: Dago Castañeda MD    Anesthesia Type: general ASA Status: 2            Anesthesia Type: No value filed.    Dafne Phase I: Dafne Score: 10    Dafne Phase II:      Anesthesia Post Evaluation    Patient location during evaluation: PACU  Patient participation: complete - patient participated  Level of consciousness: responsive to verbal stimuli and awake  Airway patency: patent  Nausea & Vomiting: no vomiting and no nausea  Cardiovascular status: blood pressure returned to baseline and hemodynamically stable  Respiratory status: acceptable  Hydration status: stable  Pain management: adequate and satisfactory to patient    No notable events documented.

## 2024-01-16 NOTE — OP NOTE
DATE OF SERVICE:  1/16/24     PREOPERATIVE DIAGNOSIS:  Third-degree hemorrhoids.     POSTOPERATIVE DIAGNOSIS:  Third-degree hemorrhoids.     PROCEDURE PERFORMED:  Transanal hemorrhoidal dearterialization.     SURGEON:  Jeffery Schwartz II, MD     ASSISTANT:  None.     ANESTHESIA:  MAC     COMPLICATIONS:  None.     SPECIMENS REMOVED:  None.     IMPLANTS:  None.     ESTIMATED BLOOD LOSS:  Minimal.     DRAINS:  None.      INDICATIONS:  This is a 48M who presents with rectal bleeding related to internal hemorrhoids.  I explained the risks and benefits of THD including, but not limited to, bleeding, infection, recurrence, and need for further procedures.  The patient understood the risks and elected to proceed.     DESCRIPTION OF PROCEDURE:  The patient was brought to the operating suite and placed in the prone jackknife position.  MAC anesthesia was induced.  The buttocks were taped apart and perianal area was prepped and draped in the usual sterile fashion.  A time-out was performed indicating the correct patient and procedure to be performed as per hospital protocol.     I placed a medium Hill-Flores retractor in the anal canal and identified several large columns of internal hemorrhoids that were likely the culprit of the rectal bleeding.  I then exchanged this for the THD ultrasound probe.  I identified two large columns of hemorrhoids that had pulsatile flow in the left lateral and right lateral positions.  A 2-0 Vicryl suture on a UR-6 needle was used to ligate these hemorrhoidal branching vessels.  This was performed in a figure-of-eight fashion.  I then performed a mucopexy of the redundant mucosa distal to this using the same suture.  This process was repeated for all hemorrhoidal columns.  After completing this process, the ultrasound probe was not able to identify any further large pulsatile vessels and internal hemorrhoids were much smaller on anoscopy with the Hill-Flores anoscope.  A 40 mL of Exparel was

## 2024-01-16 NOTE — DISCHARGE INSTRUCTIONS
adults and in those who are debilitated or have wasting syndrome or chronic breathing disorders.   Common side effects may include:  drowsiness, headache, dizziness, tiredness; or  constipation, stomach pain, nausea, vomiting.  This is not a complete list of side effects and others may occur. Call your doctor for medical advice about side effects. You may report side effects to FDA at 8-502-DGR-1721.  What other drugs will affect oxycodone?  You may have breathing problems or withdrawal symptoms if you start or stop taking certain other medicines. Tell your doctor if you also use an antibiotic, antifungal medication, heart or blood pressure medication, seizure medication, or medicine to treat HIV or hepatitis C.  Opioid medication can interact with many other drugs and cause dangerous side effects or death. Be sure your doctor knows if you also use:  cold or allergy medicines, bronchodilator asthma/COPD medication, or a diuretic (\"water pill\");  medicines for motion sickness, irritable bowel syndrome, or overactive bladder;  other opioids --opioid pain medicine or prescription cough medicine;  a sedative like Valium --diazepam, alprazolam, lorazepam, Xanax, Klonopin, Versed, and others;  drugs that make you sleepy or slow your breathing --a sleeping pill, muscle relaxer, medicine to treat mood disorders or mental illness; or  drugs that affect serotonin levels in your body --a stimulant, or medicine for depression, Parkinson's disease, migraine headaches, serious infections, or nausea and vomiting.  This list is not complete and many other drugs may affect oxycodone. This includes prescription and over-the-counter medicines, vitamins, and herbal products. Not all possible drug interactions are listed here.  Where can I get more information?  Your pharmacist can provide more information about oxycodone.  Remember, keep this and all other medicines out of the reach of children, never share your medicines with others,

## 2024-01-16 NOTE — PERIOP NOTE
1437- Discharge instructions given and reviewed with patient's wife and patient. Sitz bath provided. PIV and telemetry removed. Patient nor wife has questions or concerns. Patient discharged to surgical entrance with all personal belongings where wife awaiting to pick patient up.

## 2024-01-17 LAB
BACTERIA SPEC CULT: NORMAL
BACTERIA SPEC CULT: NORMAL
SERVICE CMNT-IMP: NORMAL

## 2024-03-08 ENCOUNTER — TELEPHONE (OUTPATIENT)
Age: 49
End: 2024-03-08

## 2024-03-08 NOTE — TELEPHONE ENCOUNTER
1406     Patient HIPAA Verified x2     Spoke with patient and reminded him of labs and to patient [place] labs done piror to appointment patient has a verbalized understanding and has no question or concerns at this time.

## 2024-03-09 LAB
BASOPHILS # BLD AUTO: 0 X10E3/UL (ref 0–0.2)
BASOPHILS NFR BLD AUTO: 0 %
EOSINOPHIL # BLD AUTO: 0 X10E3/UL (ref 0–0.4)
EOSINOPHIL NFR BLD AUTO: 0 %
ERYTHROCYTE [DISTWIDTH] IN BLOOD BY AUTOMATED COUNT: 14.2 % (ref 11.6–15.4)
FERRITIN SERPL-MCNC: 150 NG/ML (ref 30–400)
HCT VFR BLD AUTO: 47.2 % (ref 37.5–51)
HGB BLD-MCNC: 15.5 G/DL (ref 13–17.7)
IMM GRANULOCYTES # BLD AUTO: 0 X10E3/UL (ref 0–0.1)
IMM GRANULOCYTES NFR BLD AUTO: 0 %
IRON SATN MFR SERPL: 18 % (ref 15–55)
IRON SERPL-MCNC: 58 UG/DL (ref 38–169)
LYMPHOCYTES # BLD AUTO: 1.7 X10E3/UL (ref 0.7–3.1)
LYMPHOCYTES NFR BLD AUTO: 14 %
MCH RBC QN AUTO: 29.1 PG (ref 26.6–33)
MCHC RBC AUTO-ENTMCNC: 32.8 G/DL (ref 31.5–35.7)
MCV RBC AUTO: 89 FL (ref 79–97)
MONOCYTES # BLD AUTO: 0.7 X10E3/UL (ref 0.1–0.9)
MONOCYTES NFR BLD AUTO: 5 %
NEUTROPHILS # BLD AUTO: 10 X10E3/UL (ref 1.4–7)
NEUTROPHILS NFR BLD AUTO: 81 %
PLATELET # BLD AUTO: 270 X10E3/UL (ref 150–450)
RBC # BLD AUTO: 5.33 X10E6/UL (ref 4.14–5.8)
TIBC SERPL-MCNC: 318 UG/DL (ref 250–450)
UIBC SERPL-MCNC: 260 UG/DL (ref 111–343)
WBC # BLD AUTO: 12.5 X10E3/UL (ref 3.4–10.8)

## 2024-03-13 NOTE — PROGRESS NOTES
periodic CBC monitoring per PCP  No further Hematology evaluation at this time. Please re-refer as needed    Case discussed with Dr. Arlette Tinoco.     Signed By: HAKEEM Puentes - CNP   3/15/24 at 12:28 PM EDT

## 2024-03-15 ENCOUNTER — OFFICE VISIT (OUTPATIENT)
Age: 49
End: 2024-03-15
Payer: MEDICAID

## 2024-03-15 VITALS
HEART RATE: 93 BPM | RESPIRATION RATE: 18 BRPM | WEIGHT: 248.9 LBS | DIASTOLIC BLOOD PRESSURE: 97 MMHG | SYSTOLIC BLOOD PRESSURE: 158 MMHG | OXYGEN SATURATION: 94 % | BODY MASS INDEX: 34.71 KG/M2

## 2024-03-15 DIAGNOSIS — D72.829 LEUKOCYTOSIS, UNSPECIFIED TYPE: ICD-10-CM

## 2024-03-15 DIAGNOSIS — D50.9 IRON DEFICIENCY ANEMIA, UNSPECIFIED IRON DEFICIENCY ANEMIA TYPE: ICD-10-CM

## 2024-03-15 DIAGNOSIS — D50.8 OTHER IRON DEFICIENCY ANEMIA: Primary | ICD-10-CM

## 2024-03-15 PROCEDURE — 99213 OFFICE O/P EST LOW 20 MIN: CPT

## 2024-03-15 PROCEDURE — 3077F SYST BP >= 140 MM HG: CPT

## 2024-03-15 PROCEDURE — 3080F DIAST BP >= 90 MM HG: CPT

## (undated) DEVICE — Device

## (undated) DEVICE — SYRINGE MED 10ML LUERLOCK TIP W/O SFTY DISP

## (undated) DEVICE — SYRINGE 50ML E/T

## (undated) DEVICE — NEONATAL-ADULT SPO2 SENSOR: Brand: NELLCOR

## (undated) DEVICE — CONTAINER SPEC 20 ML LID NEUT BUFF FORMALIN 10 % POLYPR STS

## (undated) DEVICE — SYR 10ML LUER LOK 1/5ML GRAD --

## (undated) DEVICE — ELECTRODE,RADIOTRANSLUCENT,FOAM,5PK: Brand: MEDLINE

## (undated) DEVICE — SET GRAV CK VLV NEEDLESS ST 3 GANGED 4WAY STPCOCK HI FLO 10

## (undated) DEVICE — INJECTION THERAPY NEEDLE CATHETER: Brand: INTERJECT

## (undated) DEVICE — BASIN EMESIS 500CC GRAY 250/CS 60/PLT: Brand: MEDEGEN MEDICAL PRODUCTS, LLC

## (undated) DEVICE — BLADE CLIPPER GEN PURP NS

## (undated) DEVICE — CATHETER IV 24GA L0.75IN OD0.6604-0.7366MM

## (undated) DEVICE — 1200 GUARD II KIT W/5MM TUBE W/O VAC TUBE: Brand: GUARDIAN

## (undated) DEVICE — ENDOSCOPIC KIT COMPLIANCE ENDOKIT

## (undated) DEVICE — CATHETER IV 20GA L1.16IN OD1.0414-1.1176MM ID0.762-0.8382MM

## (undated) DEVICE — YANKAUER,POOLE TIP,STERILE,50/CS: Brand: MEDLINE

## (undated) DEVICE — SET ADMIN 16ML TBNG L100IN 2 Y INJ SITE IV PIGGY BK DISP

## (undated) DEVICE — BASIN EMSIS 16OZ GRAPHITE PLAS KID SHP MOLD GRAD FOR ORAL

## (undated) DEVICE — GAUZE,SPONGE,FLUFF,6"X6.75",STRL,5/TRAY: Brand: MEDLINE

## (undated) DEVICE — TIP SUCT TRNSPAR RIB SURF STD BLB RIG NVENT W/ 5IN1 CONN DYND50138] MEDLINE INDUSTRIES INC]

## (undated) DEVICE — CATHETER IV 22GA L1IN OD0.8382-0.9144MM ID0.6096-0.6858MM 382523

## (undated) DEVICE — FORCEPS BX L240CM JAW DIA2.4MM ORNG L CAP W/ NDL DISP RAD

## (undated) DEVICE — STOPCOCK IV 4 W TRNSPAR

## (undated) DEVICE — CUFF BLD PRSS AD CLTH SGL TB W/ BAYNT CONN ROUNDED CORNER

## (undated) DEVICE — FIAPC® PROBE W/ FILTER 2200 C OD 2.3MM/6.9FR; L 2.2M/7.2FT: Brand: ERBE

## (undated) DEVICE — TOWEL 4 PLY TISS 19X30 SUE WHT

## (undated) DEVICE — MUI SCIENTIFIC BALLOON SR1B

## (undated) DEVICE — FIAPC® PROBE W/ FILTER 2200 A OD 2.3MM/6.9FR; L 2.2M/7.2FT: Brand: ERBE

## (undated) DEVICE — SYR 3ML LL TIP 1/10ML GRAD --

## (undated) DEVICE — SHEATH CATH ANORECT MNOMTR

## (undated) DEVICE — NEEDLE HYPO 18GA L1.5IN PNK S STL HUB POLYPR SHLD REG BVL

## (undated) DEVICE — MAJOR LAPAROTOMY-MRMC: Brand: MEDLINE INDUSTRIES, INC.

## (undated) DEVICE — SYSTEM REPROC CBL 3 LD DISPOSABLE

## (undated) DEVICE — CATHETER IV 18GA L1.16IN OD1.27-1.3462MM ID0.9398-1.016MM

## (undated) DEVICE — TAPE,CLOTH/SILK,CURAD,3"X10YD,LF,40/CS: Brand: CURAD

## (undated) DEVICE — IV START KIT: Brand: MEDLINE

## (undated) DEVICE — JELLY,LUBE,STERILE,FLIP TOP,TUBE,4-OZ: Brand: MEDLINE

## (undated) DEVICE — SOLIDIFIER MEDC 1200ML -- CONVERT TO 356117

## (undated) DEVICE — SOLUTION SCRB 2OZ 10% POVIDONE IOD ANTIMIC BTL

## (undated) DEVICE — GLOVE ORANGE PI 7 1/2   MSG9075

## (undated) DEVICE — ELECTRODE PT RET AD L9FT HI MOIST COND ADH HYDRGEL CORDED

## (undated) DEVICE — SNARE ENDOSCP POLYP MED STD AD 2.4X27X240 CM 2.8 MM OVL SENS

## (undated) DEVICE — Z DISCONTINUED PER MEDLINE LINE GAS SAMPLING O2/CO2 LNG AD 13 FT NSL W/ TBNG FILTERLINE

## (undated) DEVICE — CATH IV AUTOGRD BC PNK 20GA 25 -- INSYTE

## (undated) DEVICE — TRAP ENDOSCP POLYP 2 CHMBR DRAWER TYP

## (undated) DEVICE — HYPODERMIC SAFETY NEEDLE: Brand: MONOJECT

## (undated) DEVICE — SOLUTION IRRIG 1000ML 0.9% SOD CHL USP POUR PLAS BTL